# Patient Record
Sex: FEMALE | Race: OTHER | HISPANIC OR LATINO | Employment: FULL TIME | URBAN - METROPOLITAN AREA
[De-identification: names, ages, dates, MRNs, and addresses within clinical notes are randomized per-mention and may not be internally consistent; named-entity substitution may affect disease eponyms.]

---

## 2017-03-03 ENCOUNTER — ALLSCRIPTS OFFICE VISIT (OUTPATIENT)
Dept: OTHER | Facility: OTHER | Age: 62
End: 2017-03-03

## 2017-06-13 ENCOUNTER — ALLSCRIPTS OFFICE VISIT (OUTPATIENT)
Dept: OTHER | Facility: OTHER | Age: 62
End: 2017-06-13

## 2017-06-30 ENCOUNTER — GENERIC CONVERSION - ENCOUNTER (OUTPATIENT)
Dept: OTHER | Facility: OTHER | Age: 62
End: 2017-06-30

## 2017-07-03 ENCOUNTER — ALLSCRIPTS OFFICE VISIT (OUTPATIENT)
Dept: OTHER | Facility: OTHER | Age: 62
End: 2017-07-03

## 2017-11-17 ENCOUNTER — ALLSCRIPTS OFFICE VISIT (OUTPATIENT)
Dept: OTHER | Facility: OTHER | Age: 62
End: 2017-11-17

## 2017-11-19 NOTE — PROGRESS NOTES
Assessment    1  Controlled type 2 diabetes mellitus without complication, without long-term current use of insulin (250 00) (E11 9)   2  Hyperlipidemia (272 4) (E78 5)   3  Hypothyroidism (244 9) (E03 9)   4  Generalized anxiety disorder (300 02) (F41 1)    Plan  Controlled type 2 diabetes mellitus without complication, without long-term current useof insulin    · (1) CBC/PLT/DIFF; Status:Active; Requested for:17May2018;    · (1) COMPREHENSIVE METABOLIC PANEL; Status:Active; Requested for:17May2018;    · (1) HEMOGLOBIN A1C; Status:Active; Requested for:17May2018;    · (1) URINALYSIS (will reflex a microscopy if leukocytes, occult blood, protein or nitrites arenot within normal limits); Status:Active; Requested for:17May2018;   Generalized anxiety disorder    · DULoxetine HCl - 60 MG Oral Capsule Delayed Release Particles (Cymbalta);TAKE 1 in am, 2 in pm  Hyperlipidemia    · (1) LIPID PANEL, FASTING; Status:Active; Requested for:17May2018;   Hypothyroidism    · Levothyroxine Sodium 75 MCG Oral Tablet; Take 1 tablet daily   · (1) T4, FREE; Status:Active; Requested for:17May2018;    · (1) TSH; Status:Active; Requested for:17May2018; I will increase her do locks a teen  I will see her in 6 months  Call the office if symptoms do not improve or if they get worse  Continue diet and exercise  Chief Complaint  Here for checkup DM,lipids,thyroid      History of Present Illness  Patient is a 11year-old female presenting to the office for review offered diet-controlled diabetes, hyperlipidemia, hypothyroidism, anxiety, and general health  She had recent lab work which would like to review  Overall her physical health is good, she does feel more stressed and she is having more trouble with her âfibromyalgiaâ  Review of Systems   Constitutional: No fever, no chills, feels well, no tiredness, no recent weight gain or weight loss    Eyes: No complaints of eye pain, no red eyes, no eyesight problems, no discharge, no dry eyes, no itching of eyes  ENT: no complaints of earache, no loss of hearing, no nose bleeds, no nasal discharge, no sore throat, no hoarseness  Cardiovascular: No complaints of slow heart rate, no fast heart rate, no chest pain, no palpitations, no leg claudication, no lower extremity edema  Respiratory: No complaints of shortness of breath, no wheezing, no cough, no SOB on exertion, no orthopnea, no PND  Gastrointestinal: No complaints of abdominal pain, no constipation, no nausea or vomiting, no diarrhea, no bloody stools  Genitourinary: No complaints of dysuria, no incontinence, no pelvic pain, no dysmenorrhea, no vaginal discharge or bleeding  Musculoskeletal: arthralgias-- and-- myalgias  Integumentary: No complaints of skin rash or lesions, no itching, no skin wounds, no breast pain or lump  Neurological: No complaints of headache, no confusion, no convulsions, no numbness, no dizziness or fainting, no tingling, no limb weakness, no difficulty walking  Psychiatric: anxiety  Endocrine: No complaints of proptosis, no hot flashes, no muscle weakness, no deepening of the voice, no feelings of weakness  Hematologic/Lymphatic: No complaints of swollen glands, no swollen glands in the neck, does not bleed easily, does not bruise easily  Active Problems  1  Dietary calcium deficiency (269 3) (E58)   2  Fibromyalgia (729 1) (M79 7)   3  Generalized anxiety disorder (300 02) (F41 1)   4  Hyperlipidemia (272 4) (E78 5)   5  Hypothyroidism (244 9) (E03 9)   6  History of Inadequate exercise (V69 0) (Z72 3)   7  Obstructive sleep apnea (327 23) (G47 33)    Past Medical History    1  History of Cervical somatic dysfunction (739 1) (M99 01)   2  History of Colon cancer screening (V76 51) (Z12 11)   3  History of Encounter for gynecological examination without abnormal finding (V72 31) (Z01 419)   4  History of Encounter for screening mammogram for malignant neoplasm of breast (V76 12) (Z12 31)   5  History of Endometrial polyp (621 0) (N84 0)   6  History of Fibroids, submucosal (218 0) (D25 0)   7  Denied: History of abnormal cervical Pap smear   8  History of diabetes mellitus (V12 29) (Z86 39)   9  Denied: History of herpes simplex infection   10  History of hypercholesterolemia (V12 29) (Z86 39)   11  History of pregnancy (V13 29)   12  History of screening mammography (V15 89) (Z92 89)   13  History of sleep disturbance (V13 89) (Z87 898)   14  Denied: History of varicella   15  History of Myalgia and myositis (729 1)   16  History of Need for influenza vaccination (V04 81) (Z23)   17  History of Post-menopausal bleeding (627 1) (N95 0)   18  History of Screening for diabetic retinopathy (V80 2) (Z13 5)    The active problems and past medical history were reviewed and updated today  Surgical History  1  History of Breast Surgery Reduction Procedure Bilateral   2  History of  Section   3  History of Complete Colonoscopy   4  History of Dilation And Curettage   5  History of Hysteroscopy   6  History of Tubal Ligation    The surgical history was reviewed and updated today  Family History  Mother    1  Family history of Coronary Artery Disease (V17 49)   2  Family history of Diabetes Mellitus (V18 0)   3  Family history of hypercholesterolemia (V18 19) (Z83 42)    The family history was reviewed and updated today  Social History     · Denied: History of Alcohol Use (History)   · Caffeine Use   · Denied: History of Drug Use   · History of Inadequate exercise (V69 0) (Z72 3)   ·    · Never A Smoker   · Occupation   · Uses Safety Equipment - Seatbelts  The social history was reviewed and updated today  The social history was reviewed and is unchanged  Current Meds   1  Aspirin 81 MG TABS; take 1 tablet every other day; Therapy: (Neal Aguiar) to Recorded   2  [employer];  Check sugars once daily, fasting on one day, and 2 hours after breakfast lunch or dinner on other days  Record her sugars; Therapy: 14HQK0191 to (Last Rx:04Jun2013)  Requested for: 29BRH1926 Ordered   3  Andrew Microlet Lancets Miscellaneous; USE AS DIRECTED; Therapy: 09XPZ4047 to (Last Rx:04Jun2013)  Requested for: 14SXA0693 Ordered   4  Calcitrate TABS; Take 1 tablet daily; Therapy: (Recorded:05Mar2013) to Recorded   5  DULoxetine HCl - 60 MG Oral Capsule Delayed Release Particles; take 1 capsule daily; Therapy: 75TSC2772 to (Last Rx:47Thu9620)  Requested for: 67OFM8655 Ordered   6  Ibuprofen 800 MG Oral Tablet; TAKE 1 TABLET 3 TIMES DAILY WITH FOOD AS NEEDED; Therapy: 02TVY4512 to (Evaluate:02Apr2017)  Requested for: 70YCG1027; Last Rx:03Mar2017 Ordered   7  Levothyroxine Sodium 75 MCG Oral Tablet; TAKE 1 TABLET DAILY AS DIRECTED; Therapy: 41XPB8156 to (Coy Rodriguez)  Requested for: 73TWX6426; Last Rx:17Wvc1624 Ordered   8  Multi-Day Vitamins TABS; TAKE 1 TABLET DAILY; Therapy: (Recorded:05Mar2013) to Recorded    The medication list was reviewed and updated today  Allergies  1  No Known Drug Allergies    Vitals  Vital Signs    Recorded: 88HXA1444 02:22PM   Temperature 47 5 F   Systolic 757, LUE, Sitting   Diastolic 78, LUE, Sitting   BP CUFF SIZE Large   Height 5 ft 1 in   Weight 152 lb 8 oz   BMI Calculated 28 81   BSA Calculated 1 68       Physical Exam   Constitutional  General appearance: No acute distress, well appearing and well nourished  Eyes  Conjunctiva and lids: No swelling, erythema or discharge  Pupils and irises: Equal, round and reactive to light  Ears, Nose, Mouth, and Throat  External inspection of ears and nose: Normal    Otoscopic examination: Tympanic membranes translucent with normal light reflex  Canals patent without erythema  Nasal mucosa, septum, and turbinates: Normal without edema or erythema  Oropharynx: Normal with no erythema, edema, exudate or lesions     Pulmonary  Respiratory effort: No increased work of breathing or signs [address] Reference   Colonoscopy 6/11/12         Summary / No summary entered :    No summary entered  Documents attached :    [address] [place] [address] Rectal Surgery - Wale Aguilar; Enc: [id number] - Other - Wale Aguilar -    [facility]) (Result Document)    Health Management  History of diabetes mellitus   *VB - Eye Exam; every 2 years; Last 52TVY4293; Next Due: 95MYM2038; Active  History of Screening for diabetic retinopathy   *VB - Eye Exam; every 2 years; Last 91TJW2306; Next Due: 99WAV6533;  Active    Signatures   Electronically signed by : Mayra Jensen DO; Nov 17 2017  2:55PM EST                       (Author)

## 2017-12-13 DIAGNOSIS — E11.9 TYPE 2 DIABETES MELLITUS WITHOUT COMPLICATIONS (HCC): ICD-10-CM

## 2018-01-12 VITALS
WEIGHT: 152.5 LBS | SYSTOLIC BLOOD PRESSURE: 118 MMHG | DIASTOLIC BLOOD PRESSURE: 78 MMHG | BODY MASS INDEX: 28.79 KG/M2 | HEIGHT: 61 IN | TEMPERATURE: 96.4 F

## 2018-01-14 VITALS
TEMPERATURE: 96.6 F | HEIGHT: 61 IN | BODY MASS INDEX: 28.51 KG/M2 | SYSTOLIC BLOOD PRESSURE: 122 MMHG | WEIGHT: 151 LBS | DIASTOLIC BLOOD PRESSURE: 74 MMHG

## 2018-01-14 VITALS
HEIGHT: 61 IN | DIASTOLIC BLOOD PRESSURE: 78 MMHG | BODY MASS INDEX: 27.97 KG/M2 | SYSTOLIC BLOOD PRESSURE: 124 MMHG | WEIGHT: 148.13 LBS

## 2018-01-14 VITALS
HEIGHT: 61 IN | BODY MASS INDEX: 27.75 KG/M2 | WEIGHT: 147 LBS | DIASTOLIC BLOOD PRESSURE: 78 MMHG | SYSTOLIC BLOOD PRESSURE: 124 MMHG

## 2018-03-06 DIAGNOSIS — E03.9 ACQUIRED HYPOTHYROIDISM: Primary | ICD-10-CM

## 2018-03-06 RX ORDER — LEVOTHYROXINE SODIUM 0.07 MG/1
TABLET ORAL
Qty: 90 TABLET | Refills: 1 | Status: SHIPPED | OUTPATIENT
Start: 2018-03-06 | End: 2018-09-10 | Stop reason: SDUPTHER

## 2018-05-08 LAB — HBA1C MFR BLD HPLC: 6.2 %

## 2018-05-15 ENCOUNTER — OFFICE VISIT (OUTPATIENT)
Dept: FAMILY MEDICINE CLINIC | Facility: CLINIC | Age: 63
End: 2018-05-15
Payer: COMMERCIAL

## 2018-05-15 VITALS
DIASTOLIC BLOOD PRESSURE: 78 MMHG | WEIGHT: 154 LBS | HEIGHT: 61 IN | SYSTOLIC BLOOD PRESSURE: 120 MMHG | BODY MASS INDEX: 29.07 KG/M2

## 2018-05-15 DIAGNOSIS — F41.1 GENERALIZED ANXIETY DISORDER: Primary | ICD-10-CM

## 2018-05-15 DIAGNOSIS — E11.9 CONTROLLED TYPE 2 DIABETES MELLITUS WITHOUT COMPLICATION, WITHOUT LONG-TERM CURRENT USE OF INSULIN (HCC): ICD-10-CM

## 2018-05-15 DIAGNOSIS — Z01.419 ENCOUNTER FOR ANNUAL ROUTINE GYNECOLOGICAL EXAMINATION: ICD-10-CM

## 2018-05-15 DIAGNOSIS — M79.7 FIBROMYALGIA: ICD-10-CM

## 2018-05-15 DIAGNOSIS — E03.9 ACQUIRED HYPOTHYROIDISM: ICD-10-CM

## 2018-05-15 DIAGNOSIS — E78.5 DYSLIPIDEMIA: ICD-10-CM

## 2018-05-15 PROCEDURE — 99214 OFFICE O/P EST MOD 30 MIN: CPT | Performed by: FAMILY MEDICINE

## 2018-05-15 RX ORDER — VENLAFAXINE 75 MG/1
75 TABLET ORAL 2 TIMES DAILY
Qty: 60 TABLET | Refills: 5 | Status: SHIPPED | OUTPATIENT
Start: 2018-05-15 | End: 2018-08-14

## 2018-05-15 RX ORDER — DULOXETIN HYDROCHLORIDE 60 MG/1
60 CAPSULE, DELAYED RELEASE ORAL
COMMUNITY
End: 2018-05-15 | Stop reason: ALTCHOICE

## 2018-05-15 RX ORDER — MULTIVITAMIN
1 TABLET ORAL DAILY
COMMUNITY
End: 2018-08-14

## 2018-05-15 NOTE — ASSESSMENT & PLAN NOTE
Favorable total cholesterol/HDL ratio at 4 0  13  Continue dietary discretion, increased exercise    Recheck in 6 months

## 2018-05-15 NOTE — PROGRESS NOTES
Assessment/Plan:    Generalized anxiety disorder  Has been up and down with not taking her medication on regular basis  Will switch her to venlaxafine  and see if taking the medication on a regular basis helps to control her symptoms  I will see her in 2 months, she knows to call me if symptoms do not stabilize  Hypothyroidism  Lab work is stable  Continue current dose of levothyroxine  Recheck in 6 months  Dyslipidemia  Favorable total cholesterol/HDL ratio at 4 0  13  Continue dietary discretion, increased exercise  Recheck in 6 months    Controlled type 2 diabetes mellitus without complication, without long-term current use of insulin ([facility] [address] )  Diet-controlled  Sugars at home are good between 100/140  HB A1c is 6 2  Continue current diet and exercise  Fibromyalgia  Has not been doing well, due to not take medication consistently  Will switch her to generic Effexor and see if she gets some benefit from this  If not, I will refer her to Rheumatology as she does not want follow up with the former group  Diagnoses and all orders for this visit:    Generalized anxiety disorder  -     venlafaxine (EFFEXOR) 75 mg tablet; Take 1 tablet (75 mg total) by mouth 2 (two) times a day    Fibromyalgia  -     venlafaxine (EFFEXOR) 75 mg tablet; Take 1 tablet (75 mg total) by mouth 2 (two) times a day    Dyslipidemia    Acquired hypothyroidism    Controlled type 2 diabetes mellitus without complication, without long-term current use of insulin ([facility])    Other orders  -     aspirin 81 MG tablet; Take 81 mg by mouth  -     Discontinue: DULoxetine (CYMBALTA) 60 mg delayed release capsule; Take 60 mg by mouth  -     Multiple Vitamin (MULTI-DAY VITAMINS) TABS; Take 1 tablet by mouth daily          Subjective:   Chief Complaint   Patient presents with    Hypothyroidism    Depression     review blood work           Patient ID: Van Sport is a 65 y o  female      Patient is a 81-year-old female presenting to the office for follow-up on her hypothyroidism, diet-controlled diabetes, diet-controlled hyperlipidemia, anxiety, fibromyalgia, and general health  Recently her formulary changed with her insurance  Her generic Cymbalta now costs 50 all his for 15 pills, and she cannot afford this  She has been taking her medications per radically, and therefore suffering  Her anxiety is not well controlled, she can tell if she does not take medication on that day  Also her pain has been poorly controlled  She will be due for a Pap smear and mammogram in July, but her gynecologist retired and she would like recommendations  The following portions of the patient's history were reviewed and updated as appropriate: allergies, current medications, past family history, past medical history, past social history, past surgical history and problem list     Review of Systems   Constitutional: Positive for activity change  Negative for chills, diaphoresis, fatigue, fever and unexpected weight change  HENT: Negative for congestion, ear pain, hearing loss, nosebleeds, postnasal drip, rhinorrhea, sinus pressure, sore throat and tinnitus  Eyes: Negative for photophobia, pain, discharge, redness and visual disturbance  Respiratory: Negative for cough, chest tightness, shortness of breath and wheezing  Cardiovascular: Negative for chest pain, palpitations and leg swelling  Denies NEWSOME   Gastrointestinal: Negative for abdominal pain, blood in stool, constipation, diarrhea, nausea and vomiting  Endocrine: Negative  Genitourinary: Negative for difficulty urinating, dysuria, frequency, hematuria and urgency  Denies dysmenorrhea, menstrual difficulties   Musculoskeletal: Positive for back pain and myalgias  Negative for arthralgias and joint swelling  Skin: Negative for rash and wound          Denies skin lesions, change in birthmarks   Allergic/Immunologic: Negative for environmental allergies, food allergies and immunocompromised state  Neurological: Negative for dizziness, tremors, seizures, syncope, weakness, numbness and headaches  Hematological: Negative  Psychiatric/Behavioral: Positive for dysphoric mood  Negative for behavioral problems, confusion, decreased concentration and sleep disturbance  The patient is nervous/anxious  Objective:      /78   Ht 5' 1" (1 549 m)   Wt 69 9 kg (154 lb)   BMI 29 10 kg/m²          Physical Exam   Constitutional: She is oriented to person, place, and time  She appears well-developed and well-nourished  No distress  HENT:   Head: Normocephalic and atraumatic  Right Ear: External ear normal    Left Ear: External ear normal    Nose: Nose normal    Mouth/Throat: Oropharynx is clear and moist    Eyes: Conjunctivae and EOM are normal  Pupils are equal, round, and reactive to light  Neck: Normal range of motion  Neck supple  No JVD present  No tracheal deviation present  No thyromegaly present  Cardiovascular: Normal rate, regular rhythm and normal heart sounds  No murmur heard  Pulmonary/Chest: Effort normal and breath sounds normal  She has no wheezes  She has no rales  Abdominal: Soft  Bowel sounds are normal  She exhibits no mass  There is no tenderness  There is no rebound and no guarding  Musculoskeletal: Normal range of motion  She exhibits no edema  Thoracic back: She exhibits tenderness  Lumbar back: She exhibits tenderness  Lymphadenopathy:     She has no cervical adenopathy  Neurological: She is alert and oriented to person, place, and time  She has normal reflexes  No cranial nerve deficit  Skin: Skin is warm and dry  No lesion and no rash noted  Psychiatric: She has a normal mood and affect   Judgment normal

## 2018-05-15 NOTE — ASSESSMENT & PLAN NOTE
Diet-controlled  Sugars at home are good between 100/140  HB A1c is 6 2  Continue current diet and exercise

## 2018-05-15 NOTE — ASSESSMENT & PLAN NOTE
Has been up and down with not taking her medication on regular basis  Will switch her to venlaxafine  and see if taking the medication on a regular basis helps to control her symptoms  I will see her in 2 months, she knows to call me if symptoms do not stabilize

## 2018-05-15 NOTE — ASSESSMENT & PLAN NOTE
Has not been doing well, due to not take medication consistently  Will switch her to generic Effexor and see if she gets some benefit from this  If not, I will refer her to Rheumatology as she does not want follow up with the former group

## 2018-05-17 DIAGNOSIS — E03.9 HYPOTHYROIDISM: ICD-10-CM

## 2018-05-17 DIAGNOSIS — E78.5 HYPERLIPIDEMIA: ICD-10-CM

## 2018-05-17 DIAGNOSIS — E11.9 TYPE 2 DIABETES MELLITUS WITHOUT COMPLICATIONS (HCC): ICD-10-CM

## 2018-07-03 ENCOUNTER — TELEPHONE (OUTPATIENT)
Dept: FAMILY MEDICINE CLINIC | Facility: CLINIC | Age: 63
End: 2018-07-03

## 2018-07-03 NOTE — TELEPHONE ENCOUNTER
Have her take 1/2 a tablet today and tomorrow and discuss it with Dr Michael Sanderson on Thursday I also see patient has an OV with him scheduled

## 2018-07-17 ENCOUNTER — OFFICE VISIT (OUTPATIENT)
Dept: FAMILY MEDICINE CLINIC | Facility: CLINIC | Age: 63
End: 2018-07-17
Payer: COMMERCIAL

## 2018-07-17 VITALS
HEIGHT: 61 IN | SYSTOLIC BLOOD PRESSURE: 110 MMHG | DIASTOLIC BLOOD PRESSURE: 80 MMHG | BODY MASS INDEX: 29.04 KG/M2 | WEIGHT: 153.8 LBS

## 2018-07-17 DIAGNOSIS — F41.1 GENERALIZED ANXIETY DISORDER: Primary | ICD-10-CM

## 2018-07-17 DIAGNOSIS — M13.0 POLYARTHROPATHY: ICD-10-CM

## 2018-07-17 DIAGNOSIS — M79.7 FIBROMYALGIA: ICD-10-CM

## 2018-07-17 PROBLEM — R79.89 ABNORMAL LIVER FUNCTION TESTS: Status: ACTIVE | Noted: 2018-07-17

## 2018-07-17 PROCEDURE — 99214 OFFICE O/P EST MOD 30 MIN: CPT | Performed by: FAMILY MEDICINE

## 2018-07-17 NOTE — ASSESSMENT & PLAN NOTE
Moods are better on Effexor  , they were even better on generic Cymbalta, but is too expensive for the patient  Will continue current dose for now    Recheck in 3 months

## 2018-07-17 NOTE — ASSESSMENT & PLAN NOTE
No improvement with generic Effexor  Lower back is bothering her a bit right now  Would like to get a 2nd opinion  Will refer to Rheumatology  Will check some additional markers as well  Staff will contact her once we have the results

## 2018-07-17 NOTE — PROGRESS NOTES
Assessment/Plan:    Generalized anxiety disorder  Moods are better on Effexor  , they were even better on generic Cymbalta, but is too expensive for the patient  Will continue current dose for now  Recheck in 3 months    Fibromyalgia  No improvement with generic Effexor  Lower back is bothering her a bit right now  Would like to get a 2nd opinion  Will refer to Rheumatology  Will check some additional markers as well  Staff will contact her once we have the results  Diagnoses and all orders for this visit:    Generalized anxiety disorder    Fibromyalgia  -     Ambulatory referral to Rheumatology; Future  -     TRACIE Screen w/ Reflex to Titer/Pattern; Future  -     Comprehensive metabolic panel; Future  -     C-reactive protein; Future  -     TSH, 3rd generation; Future  -     RF Screen w/ Reflex to Titer; Future  -     Sedimentation rate, automated; Future  -     CBC and differential; Future  -     HLA-B27 ANTIGEN (HISTORICAL); Future  -     CK (with reflex to MB); Future  -     Aldolase; Future  -     Cyclic citrul peptide antibody, IgG; Future  -     Sjogren's Antibodies; Future    Polyarthropathy          Subjective:   Chief Complaint   Patient presents with    Follow-up     anxiety  no refills needed           Patient ID: Latoya Sousa is a 65 y o  female  Patient is a 52-year-old female presenting to the office for follow-up on her anxiety and fibromyalgia  She was started on Effexor approximately2  months ago  Had been on generic Cymbalta in the past, unfortunately there is a very high co-pay for that medication with her insurance plan and is not covered so she was only taking it intermittently  That medication did seem to help her a bit  She reports that her moods have improved, but her muscle aches and pains are if anything worse  Now having more difficulty with her lumbar area          The following portions of the patient's history were reviewed and updated as appropriate: allergies, current medications, past family history, past medical history, past social history, past surgical history and problem list     Review of Systems   Constitutional: Positive for activity change and fatigue  Negative for appetite change, fever and unexpected weight change  HENT: Negative  Eyes: Negative  Respiratory: Negative  Cardiovascular: Negative  Gastrointestinal: Negative  Endocrine: Negative  Genitourinary: Negative  Musculoskeletal: Positive for arthralgias, back pain, gait problem, joint swelling, myalgias, neck pain and neck stiffness  Neurological: Negative for dizziness, tremors, weakness, light-headedness, numbness and headaches  Hematological: Negative for adenopathy  Does not bruise/bleed easily  Psychiatric/Behavioral: Positive for decreased concentration and dysphoric mood  The patient is nervous/anxious  Objective:      /80   Ht 5' 1" (1 549 m)   Wt 69 8 kg (153 lb 12 8 oz)   BMI 29 06 kg/m²          Physical Exam   Constitutional: She is oriented to person, place, and time  She appears well-developed and well-nourished  No distress  HENT:   Head: Normocephalic and atraumatic  Right Ear: External ear normal    Left Ear: External ear normal    Nose: Nose normal    Mouth/Throat: Oropharynx is clear and moist    Eyes: Conjunctivae and EOM are normal  Pupils are equal, round, and reactive to light  Neck: Normal range of motion  Neck supple  No JVD present  No tracheal deviation present  No thyromegaly present  Cardiovascular: Normal rate, regular rhythm and normal heart sounds  No murmur heard  Pulmonary/Chest: Effort normal and breath sounds normal  She has no wheezes  She has no rales  Abdominal: Soft  Bowel sounds are normal  She exhibits no mass  There is no tenderness  There is no rebound and no guarding  Musculoskeletal: Normal range of motion  She exhibits no edema or tenderness     Tenderness of the thoracolumbar spine, worse in the right lumbar area  Multiple triggering points throughout however  Lymphadenopathy:     She has no cervical adenopathy  Neurological: She is alert and oriented to person, place, and time  She has normal reflexes  No cranial nerve deficit  Skin: Skin is warm and dry  No lesion and no rash noted  Psychiatric: She has a normal mood and affect  Judgment normal    Nursing note and vitals reviewed

## 2018-07-20 ENCOUNTER — HOSPITAL ENCOUNTER (EMERGENCY)
Facility: HOSPITAL | Age: 63
Discharge: HOME | End: 2018-07-20
Admitting: EMERGENCY MEDICINE
Payer: COMMERCIAL

## 2018-07-20 ENCOUNTER — TELEPHONE (OUTPATIENT)
Dept: PHYSICAL THERAPY | Facility: OTHER | Age: 63
End: 2018-07-20

## 2018-07-20 ENCOUNTER — DOCUMENTATION (OUTPATIENT)
Dept: PHYSICAL THERAPY | Facility: OTHER | Age: 63
End: 2018-07-20

## 2018-07-20 ENCOUNTER — APPOINTMENT (EMERGENCY)
Dept: RADIOLOGY | Facility: HOSPITAL | Age: 63
End: 2018-07-20
Payer: COMMERCIAL

## 2018-07-20 VITALS
SYSTOLIC BLOOD PRESSURE: 160 MMHG | DIASTOLIC BLOOD PRESSURE: 91 MMHG | OXYGEN SATURATION: 100 % | HEART RATE: 89 BPM | RESPIRATION RATE: 18 BRPM | TEMPERATURE: 98 F

## 2018-07-20 DIAGNOSIS — M51.36 DEGENERATIVE DISC DISEASE, LUMBAR: ICD-10-CM

## 2018-07-20 DIAGNOSIS — M54.50 LOW BACK PAIN: Primary | ICD-10-CM

## 2018-07-20 LAB
BILIRUB UR QL STRIP: NEGATIVE
CLARITY UR: NORMAL
COLOR UR: YELLOW
GLUCOSE UR STRIP-MCNC: NEGATIVE MG/DL
HGB UR QL STRIP.AUTO: NEGATIVE
KETONES UR STRIP-MCNC: NEGATIVE MG/DL
LEUKOCYTE ESTERASE UR QL STRIP: NEGATIVE
NITRITE UR QL STRIP: NEGATIVE
PH UR STRIP.AUTO: 6 [PH] (ref 4.5–8)
PROT UR STRIP-MCNC: NEGATIVE MG/DL
SP GR UR STRIP.AUTO: 1.02 (ref 1–1.03)
UROBILINOGEN UR QL STRIP.AUTO: 0.2 E.U./DL

## 2018-07-20 PROCEDURE — 99283 EMERGENCY DEPT VISIT LOW MDM: CPT

## 2018-07-20 PROCEDURE — 72100 X-RAY EXAM L-S SPINE 2/3 VWS: CPT

## 2018-07-20 PROCEDURE — 81003 URINALYSIS AUTO W/O SCOPE: CPT

## 2018-07-20 RX ORDER — CYCLOBENZAPRINE HCL 5 MG
5 TABLET ORAL 3 TIMES DAILY PRN
Qty: 15 TABLET | Refills: 0 | Status: SHIPPED | OUTPATIENT
Start: 2018-07-20 | End: 2018-08-14

## 2018-07-20 RX ORDER — NAPROXEN 500 MG/1
500 TABLET ORAL 2 TIMES DAILY WITH MEALS
Qty: 30 TABLET | Refills: 0 | Status: SHIPPED | OUTPATIENT
Start: 2018-07-20 | End: 2018-08-14

## 2018-07-20 NOTE — ED PROVIDER NOTES
History  Chief Complaint   Patient presents with    Back Pain     Patient reports lower back pain which began a week ago  Denies injury, radiation of pain, loss of bowel or bladder function  Patient is reporting increased urinary frequency and burning with urination  Patient c/o low back pain x 1 week  Worse with movement- twisting and bending  Also c/o urinary frequency and lower abdominal pressure  Thought she had a yeast infection, took Monistat  Back Pain   Location:  Lumbar spine  Quality:  Aching  Radiates to:  Does not radiate  Pain severity:  Moderate  Pain is:  Same all the time  Onset quality:  Gradual  Duration:  1 week  Timing:  Constant  Progression:  Unchanged  Chronicity:  New  Context: not recent injury    Relieved by:  Nothing  Worsened by:  Nothing  Ineffective treatments:  None tried  Associated symptoms: pelvic pain (pressure)    Associated symptoms: no bladder incontinence, no bowel incontinence, no chest pain, no dysuria, no headaches, no perianal numbness, no tingling and no weakness        Prior to Admission Medications   Prescriptions Last Dose Informant Patient Reported? Taking? Multiple Vitamin (MULTI-DAY VITAMINS) TABS 7/20/2018 at Unknown time  Yes Yes   Sig: Take 1 tablet by mouth daily   aspirin 81 MG tablet 7/20/2018 at Unknown time  Yes Yes   Sig: Take 81 mg by mouth   levothyroxine 75 mcg tablet 7/20/2018 at Unknown time  No Yes   Sig: TAKE 1 TABLET DAILY AS DIRECTED     venlafaxine (EFFEXOR) 75 mg tablet Past Week at Unknown time  No Yes   Sig: Take 1 tablet (75 mg total) by mouth 2 (two) times a day   Patient taking differently: Take 75 mg by mouth daily        Facility-Administered Medications: None       Past Medical History:   Diagnosis Date    Abnormal cervical Papanicolaou smear     6/15 wnl, -HPV 6/18    Cervical somatic dysfunction     last assessed 3/3/2017    Diabetes mellitus ([facility] [address] )     last assessed 6/11/2017    Endometrial polyp     last assessed 11/10/2014    Fibroids, submucosal 2015    [id number] anteriorly by [facility]    Herpes simplex     Hypercholesterolemia     resolved 2015    Postmenopausal bleeding     last assessed 11/10/2014, endometrial polyp, D&C 10/14    Sleep disturbance     last assessed 3/5/2012    Varicella        Past Surgical History:   Procedure Laterality Date    BREAST SURGERY Bilateral     2014  reduction procedure     SECTION  1976    fetal intolerance    COLONOSCOPY      resolved 2012, Dr Braulio Boston - normal repeat in     [facility]  2014    PMB, polyp path;  endometrial polyp with focal tubular hyperplasia, benign, inactive endometrial curettings    HYSTEROSCOPY  2014    path; benign    TUBAL LIGATION Bilateral        Family History   Problem Relation Age of Onset    Coronary artery disease Mother     Diabetes Mother         DM    Hyperlipidemia Mother         hypercholesterolemia     I have reviewed and agree with the history as documented  Social History   Substance Use Topics    Smoking status: Never Smoker    Smokeless tobacco: Never Used    Alcohol use No      Comment: history        Review of Systems   Constitutional: Negative for activity change  Eyes: Negative for visual disturbance  Respiratory: Negative for shortness of breath  Cardiovascular: Negative for chest pain  Gastrointestinal: Negative for bowel incontinence  Genitourinary: Positive for frequency and pelvic pain (pressure)  Negative for bladder incontinence and dysuria  Musculoskeletal: Positive for back pain  Skin: Negative for color change  Neurological: Negative for dizziness, tingling, weakness and headaches  Psychiatric/Behavioral: Negative for behavioral problems  Physical Exam  Physical Exam   Constitutional: She is oriented to person, place, and time  She appears well-developed and well-nourished  No distress  HENT:   Head: Normocephalic and atraumatic  Eyes: Conjunctivae and EOM are normal    Pulmonary/Chest: Effort normal  No respiratory distress  Musculoskeletal: Normal range of motion  Lumbar back: She exhibits normal range of motion, no tenderness, no bony tenderness, no swelling, no pain and no spasm  Negative straight leg raise   Neurological: She is alert and oriented to person, place, and time  She has normal strength  No sensory deficit  Coordination and gait normal    Reflex Scores:       Patellar reflexes are 2+ on the right side and 3+ on the left side  Skin: Skin is warm and dry  No rash noted  Psychiatric: She has a normal mood and affect   Her behavior is normal        Vital Signs  ED Triage Vitals [07/20/18 1409]   Temperature Pulse Respirations Blood Pressure SpO2   98 °F (36 7 °C) 89 18 160/91 100 %      Temp Source Heart Rate Source Patient Position - Orthostatic VS BP Location FiO2 (%)   Temporal Monitor Sitting Right arm --      Pain Score       5           Vitals:    07/20/18 1409   BP: 160/91   Pulse: 89   Patient Position - Orthostatic VS: Sitting       Visual Acuity      ED Medications  Medications - No data to display    Diagnostic Studies  Results Reviewed     Procedure Component Value Units Date/Time    POCT urinalysis dipstick [[id number]]  (Abnormal) Resulted:  07/20/18 1454    Lab Status:  Final result Specimen:  Urine Updated:  07/20/18 1454    ED Urine Macroscopic [[id number]] Collected:  07/20/18 1451    Lab Status:  Final result Specimen:  Urine Updated:  07/20/18 1445     Color, UA Yellow     Clarity, UA Cloudy     pH, UA 6 0     Leukocytes, UA Negative     Nitrite, UA Negative     Protein, UA Negative mg/dl      Glucose, UA Negative mg/dl      Ketones, UA Negative mg/dl      Urobilinogen, UA 0 2 E U /dl      Bilirubin, UA Negative     Blood, UA Negative     Specific Gravity, UA 1 020    Narrative:       CLINITEK RESULT                 XR lumbar spine 2 or 3 views   ED Interpretation by Jayant Rebolledo PA-C (07/20 9009)   Mild disc degeneration throughout most pronounced at L5-S1      Final Result by Valeria Beal MD (07/20 1919)      Degenerative change  Possible spondylolysis at L5 without spondylolisthesis  Workstation performed: [id number]                    Procedures  Procedures       Phone Contacts  ED Phone Contact    ED Course                               MDM  CritCare Time    Disposition  Final diagnoses:   Low back pain   Degenerative disc disease, lumbar     Time reflects when diagnosis was documented in both MDM as applicable and the Disposition within this note     Time User Action Codes Description Comment    7/20/2018  3:15 PM Scarlet Devoid Add [M54 5] Low back pain     7/20/2018  3:15 PM Scarlet Devoid Add [M51 36] Degenerative disc disease, lumbar       ED Disposition     ED Disposition Condition Comment    Discharge  Alexandro Truong discharge to home/self care      Condition at discharge: Stable        Follow-up Information     Follow up With Specialties Details Why Contact Info Additional [address] [facility],  Family Medicine   [address] Emergency Department Emergency Medicine  As needed, If symptoms worsen [address] [facility] ED, [address] [facility] , [facility], [address] [facility], [address] [facility] Go in 1 week As needed [address], [address] [facility] [address] [facility], [address], [address] [facility], [facility], [address] [facility], [address]          Patient's Medications   Discharge Prescriptions    CYCLOBENZAPRINE (FLEXERIL) 5 MG TABLET    Take 1 tablet (5 mg total) by mouth 3 (three) times a day as needed for muscle spasms for up to 5 days       Start Date: 7/20/2018 End Date: 7/25/2018       Order Dose: 5 mg       Quantity: 15 tablet    Refills: 0    NAPROXEN (NAPROSYN) 500 MG TABLET Take 1 tablet (500 mg total) by mouth 2 (two) times a day with meals       Start Date: 7/20/2018 End Date: --       Order Dose: 500 mg       Quantity: 30 tablet    Refills: 0       Outpatient Discharge Orders  Ambulatory Referral to Comprehensive Spine Program   Standing Status: Future  Standing Exp   Date: 01/20/19         ED Provider  Electronically Signed by           Thee Johansen PA-C  07/20/18 1527

## 2018-07-20 NOTE — PROGRESS NOTES
In ED summary and notes, patient was instructed to go to [address] [facility] in [facility]  Referral transferred to appropriate source, when unable to contact patient, to avoid delay in appropriate coordination of care

## 2018-07-20 NOTE — TELEPHONE ENCOUNTER
Attempted to contact patient, patient unavailable  Left voicemail message with callback phone number

## 2018-07-20 NOTE — DISCHARGE INSTRUCTIONS
Acute Low Back Pain, Ambulatory Care   GENERAL INFORMATION:   Acute low back pain  is discomfort in your lower back area that lasts for less than 12 weeks  The word acute is used to describe pain that starts suddenly, worsens quickly, and lasts for a short time  Common symptoms include the following:   · Back stiffness or spasms    · Pain down the back or side of one leg    · Holding yourself in an unusual position or posture to decrease your back pain    · Not being able to find a sitting position that is comfortable    · Slow increase in your pain for 24 to 48 hours after you stress your back    · Tenderness on your lower back or severe pain when you move your back  Seek immediate care for the following symptoms:   · Severe pain    · Sudden stiffness and heaviness in both buttocks down to both legs    · Numbness or weakness in one leg, or pain in both legs    · Numbness in your genital area or across your lower back    · Unable to control your urine or bowel movements  Treatment for acute low back pain  may include any of the following:  · Medicines:      ¨ NSAIDs  help decrease swelling and pain or fever  This medicine is available with or without a doctor's order  NSAIDs can cause stomach bleeding or kidney problems in certain people  If you take blood thinner medicine, always ask your healthcare provider if NSAIDs are safe for you  Always read the medicine label and follow directions  ¨ Muscle relaxers  help decrease muscle spasms pain  ¨ Prescription pain medicine  may be given  Ask how to take this medicine safely  · Surgery  may be needed if your pain is severe and other treatments do not work  Surgery may be needed for conditions of the lumbar spine, such as herniated disc or spinal stenosis  Manage your symptoms:   · Sleep on a firm mattress  If you do not have a firm mattress, have someone move your mattress to the floor for a few days   A piece of plywood under your mattress can also help make it firmer  · Apply ice  on your lower back for 15 to 20 minutes every hour or as directed  Use an ice pack, or put crushed ice in a plastic bag  Cover it with a towel  Ice helps prevent tissue damage and decreases swelling and pain  You can alternate ice and heat  · Apply heat  on your lower back for 20 to 30 minutes every 2 hours for as many days as directed  Heat helps decrease pain and muscle spasms  · Go to physical therapy  A physical therapist teaches you exercises to help improve movement and strength, and to decrease pain  Prevent acute low back pain:   · Use proper body mechanics  ¨ Bend at the hips and knees when you  objects  Do not bend from the waist  Use your leg muscles as you lift the load  Do not use your back  Keep the object close to your chest as you lift it  Try not to twist or lift anything above your waist     ¨ Change your position often when you stand for long periods of time  Rest one foot on a small box or footrest, and then switch to the other foot often  ¨ Try not to sit for long periods of time  When you do, sit in a straight-backed chair with your feet flat on the floor  Never reach, pull, or push while you are sitting  · Exercise regularly  Warm up before you exercise  Do exercises that strengthen your back muscles  Ask about the best exercise plan for you  · Maintain a healthy weight  Ask your healthcare provider how much you should weigh  Ask him to help you create a weight loss plan if you are overweight  Follow up with your healthcare provider as directed:  Return for a follow-up visit if you still have pain after 1 to 3 weeks of treatment  You may need to visit an orthopedist if your back pain lasts more than 6 to 12 weeks  Write down your questions so you remember to ask them during your visits  CARE AGREEMENT:   You have the right to help plan your care  Learn about your health condition and how it may be treated   Discuss treatment options with your caregivers to decide what care you want to receive  You always have the right to refuse treatment  The above information is an  only  It is not intended as medical advice for individual conditions or treatments  Talk to your doctor, nurse or pharmacist before following any medical regimen to see if it is safe and effective for you  © 2014 [address] is for End User's use only and may not be sold, redistributed or otherwise used for commercial purposes  All illustrations and images included in CareNotes® are the copyrighted property of A D A M , Inc  or [employer]

## 2018-07-24 ENCOUNTER — NURSE TRIAGE (OUTPATIENT)
Dept: PHYSICAL THERAPY | Facility: OTHER | Age: 63
End: 2018-07-24

## 2018-07-24 DIAGNOSIS — M54.50 ACUTE RIGHT-SIDED LOW BACK PAIN WITHOUT SCIATICA: Primary | ICD-10-CM

## 2018-07-24 DIAGNOSIS — M54.2 NECK PAIN: ICD-10-CM

## 2018-07-24 NOTE — TELEPHONE ENCOUNTER
Reports neck pain as well but only bothers her in the right shoulder    Background - Initial Assessment  Clinical complaint: low back pain, right sided  Date of onset: 7/20/18  Mechanism of injury: unk    Previous Treatment - Previous Treatment  Previous evaluation: ED visit  Current provider: *No Answer*  Diagnostics: xray  Previous treatment: *No Answer*    Protocols used: [facility] PROTOCOL

## 2018-08-03 ENCOUNTER — EVALUATION (OUTPATIENT)
Dept: PHYSICAL THERAPY | Facility: MEDICAL CENTER | Age: 63
End: 2018-08-03
Payer: COMMERCIAL

## 2018-08-03 VITALS — TEMPERATURE: 98 F | HEART RATE: 74 BPM | SYSTOLIC BLOOD PRESSURE: 115 MMHG | DIASTOLIC BLOOD PRESSURE: 80 MMHG

## 2018-08-03 DIAGNOSIS — M54.50 ACUTE RIGHT-SIDED LOW BACK PAIN WITHOUT SCIATICA: ICD-10-CM

## 2018-08-03 DIAGNOSIS — M54.2 NECK PAIN: ICD-10-CM

## 2018-08-03 PROCEDURE — 97161 PT EVAL LOW COMPLEX 20 MIN: CPT | Performed by: PHYSICAL THERAPIST

## 2018-08-03 PROCEDURE — G8990 OTHER PT/OT CURRENT STATUS: HCPCS | Performed by: PHYSICAL THERAPIST

## 2018-08-03 PROCEDURE — G8992 OTHER PT/OT  D/C STATUS: HCPCS | Performed by: PHYSICAL THERAPIST

## 2018-08-03 PROCEDURE — G8991 OTHER PT/OT GOAL STATUS: HCPCS | Performed by: PHYSICAL THERAPIST

## 2018-08-03 NOTE — PROGRESS NOTES
PT Evaluation     Today's date: 8/3/2018  Patient name: Marvin Cuenca  : 1955  MRN: [id number]  Referring provider: Shauna Aquino MD  Dx:   Encounter Diagnosis     ICD-10-CM    1  Acute right-sided low back pain without sciatica M54 5 Ambulatory referral to PT spine   2  Neck pain M54 2 Ambulatory referral to PT spine                  Assessment  Impairments: abnormal coordination, abnormal muscle firing, abnormal muscle tone, abnormal or restricted ROM, abnormal movement, activity intolerance, impaired physical strength, lacks appropriate home exercise program and pain with function    Assessment details: Marvin Cuenca is a pleasant 65 y o  female who presents with lower back pain secondary to moderate arthritic changes and concurrent hypomobility of lumbar spine  patient has complicating factor of fibromyalgia  Patient notes that she is 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  Patient states that her back pain is worse with sitting or standing for long periods of time and makes transitions difficult  Today patient is seen through referral from [address] spine and has been placed in the low risk catagory  Patient was treated with stretches and core stability with education about posturing, foam rolling, and HEP  This will be her only visit to PT and she was instructed to return if needed in the future  No further referral appears necessary at this time based upon examination results  Primary movement impairment diagnosis of lumbar hypomobility resulting in pathoanatomical symptoms of Acute right-sided low back pain without sciatica  Neck pain and limiting her ability to go to work, sit, stand and walk    Impairments include:  1) lumbar hypomobility - addressing with mobs and mobility exercises   2) poor lifting mechanics - addressing with functional retraining  3) poor lumbopelvic movement coordination - addressing with neuromotor retraining  Etiologic factors include none recalled by the patient  Understanding of Dx/Px/POC: good   Prognosis: good  Prognosis details: Positive prognostic indicators include positive attitude toward recovery  Negative prognostic indicators include fibromyalgia    Goals  Patient will be independent with home exercise program   - GOAL MET    Plan  Patient would benefit from: skilled PT  Referral necessary: No  Planned modality interventions: thermotherapy: hydrocollator packs and TENS  Planned therapy interventions: abdominal trunk stabilization, activity modification, patient education, self care, therapeutic activities, therapeutic exercise and home exercise program        Subjective Evaluation    Pain  Current pain ratin  At best pain ratin  At worst pain ratin  Quality: dull ache          Objective     Special Questions      Additional Special Questions  No red flags present  Lower quarter screen unremarkable    Static Posture     Head  Forward  Shoulders  Rounded  Postural Observations  Seated posture: poor  Standing posture: fair  Correction of posture: makes symptoms better        Palpation   Left   Hypertonic in the erector spinae and quadratus lumborum  Hypotonic in the transverse abdominus  Right   Hypertonic in the erector spinae and quadratus lumborum  Hypotonic in the transverse abdominus       Neurological Testing     Sensation     Lumbar   Left   Intact: light touch    Right   Intact: light touch    Reflexes   Left   Patellar (L4): normal (2+)  Achilles (S1): normal (2+)  Babinski sign: negative  Clonus sign: negative    Right   Patellar (L4): normal (2+)  Achilles (S1): normal (2+)  Babinski sign: negative  Clonus sign: negative    Active Range of Motion     Lumbar   Flexion: with pain  Extension: with pain  Left Knee   Normal active range of motion    Right Knee   Normal active range of motion  Left Ankle/Foot   Normal active range of motion    Right Ankle/Foot   Normal active range of motion    Additional Active Range of Motion Details  Flexion 50%  Extension 50%  SB 50%  Rotation 50%    Passive Range of Motion   Left Hip   Internal rotation (prone): 40 degrees     Right Hip   Internal rotation (prone): 40 degrees     Strength/Myotome Testing     Lumbar   Left   Heel walk: normal  Toe walk: normal    Right   Heel walk: normal  Toe walk: normal    Left Hip   Planes of Motion   Flexion: WFL  Extension: WFL  Abduction: WFL  Adduction: WFL    Right Hip   Planes of Motion   Flexion: WFL  Extension: WFL  Abduction: WFL  Adduction: WFL    Left Knee   Flexion: WFL  Extension: WFL    Right Knee   Flexion: WFL  Extension: WFL    Left Ankle/Foot   Dorsiflexion: WFL  Plantar flexion: WFL  Inversion: WFL  Eversion: WFL  Great toe extension: WFL  Right Ankle/Foot   Dorsiflexion: WFL  Plantar flexion: WFL  Inversion: WFL  Eversion: WFL  Great toe extension: WFL  Muscle Activation     Additional Muscle Activation Details  Patient demonstrates poor activation of Transversus Abdominus and multifidus force couple and loss of feed forward mechanism with reaching tasks  Patient was able to perform activation with cueing  Tests     Lumbar   Negative repeated flexion and repeated extension  Left   Negative crossed SLR, Roman and passive SLR  Right   Negative crossed SLR, Roman and passive SLR  Left Pelvic Girdle/Sacrum   Negative: sacrum compression, sacral spring and thigh thrust      Right Pelvic Girdle/Sacrum   Negative: sacrum compression, sacral spring and thigh thrust      Left Hip   Negative ISABELLA, FADIR, Gaenslen's, scour, SI compression and SI distraction  SLR: Negative  Right Hip   Negative ISABELLA, FADIR, Gaenslen's, scour, SI compression and SI distraction  SLR: Negative       Ambulation   Weight-Bearing Status   Weight-Bearing Status (Left): full weight bearing   Weight-Bearing Status (Right): full weight-bearing      Observational Gait   Gait: within functional limits     Functional Assessment     Single Leg Stance   Left: 10 (EO & EC) seconds  Right: 10 (EO & EC) seconds          Precautions: fibromyalgia      Daily Treatment Diary patient was given HEP print out    Manual  8/3                                                                                 Exercise Diary              Hip flexor str             Hamstring str [id number]            Piriformis str [id number]            LTR [id number]            SKTC [id number]            SLR 20            Bridging with ball between knees 20            Clamshell with band 20            Standing hip abduction 20            Standing hip extension 20                                      Bike                                                                                                            Modalities                           Moist heat

## 2018-08-14 ENCOUNTER — OFFICE VISIT (OUTPATIENT)
Dept: OBGYN CLINIC | Facility: CLINIC | Age: 63
End: 2018-08-14
Payer: COMMERCIAL

## 2018-08-14 VITALS
WEIGHT: 154 LBS | DIASTOLIC BLOOD PRESSURE: 75 MMHG | SYSTOLIC BLOOD PRESSURE: 117 MMHG | BODY MASS INDEX: 29.07 KG/M2 | HEIGHT: 61 IN

## 2018-08-14 DIAGNOSIS — Z01.419 WOMEN'S ANNUAL ROUTINE GYNECOLOGICAL EXAMINATION: ICD-10-CM

## 2018-08-14 DIAGNOSIS — Z11.51 SCREENING FOR HUMAN PAPILLOMAVIRUS (HPV): Primary | ICD-10-CM

## 2018-08-14 PROCEDURE — G0145 SCR C/V CYTO,THINLAYER,RESCR: HCPCS | Performed by: OBSTETRICS & GYNECOLOGY

## 2018-08-14 PROCEDURE — 99386 PREV VISIT NEW AGE 40-64: CPT | Performed by: OBSTETRICS & GYNECOLOGY

## 2018-08-14 PROCEDURE — 87624 HPV HI-RISK TYP POOLED RSLT: CPT | Performed by: OBSTETRICS & GYNECOLOGY

## 2018-08-14 RX ORDER — DULOXETIN HYDROCHLORIDE 60 MG/1
CAPSULE, DELAYED RELEASE ORAL
COMMUNITY
Start: 2018-08-04 | End: 2018-11-16 | Stop reason: SDUPTHER

## 2018-08-14 NOTE — PROGRESS NOTES
A/p    1  Annual exam    Pt has no gyn complaints   No vaginal bleeding no discharge   Last pap 6/16/2015 - wnl    Reviewed the scheduling of paps and one done today     2  Bladder / lower ab pressure   Pt was diagnosed with a bone fragment in the lower back that is causing this  pressure   Since diagnosis she getting pt and exercise Is making better   No urge or stress incontinence     3  Health Maintentance       Pap -6/19/2015 wnl   Mammogram -7/2018 wnl    Colonoscopy - 6/11/2012 - WNL      64 y/o female with complaints of vaginal / bladder pressure  Pt was seen in the ED and was told she had a back bone spur that was pressing on nerve and causing the pain   The patient has since started PT and doing exercise and is better   Denies any loss of urine and or stool    Past medical / surgical / social / family history reviewed  Medication and allergies reviewed and updated   All scripts reviewed for dating and test results  Review of Systems - Negative except listed in HPI    /75 (BP Location: Right arm, Patient Position: Sitting, Cuff Size: Standard)   Ht 5' 1" (1 549 m)   Wt 69 9 kg (154 lb)   BMI 29 10 kg/m²     Physical Exam   Constitutional: She is oriented to person, place, and time  She appears well-developed  Eyes: Pupils are equal, round, and reactive to light  Neck: Normal range of motion  No tracheal deviation present  No thyromegaly present  Pulmonary/Chest: Effort normal  No respiratory distress  Right breast exhibits no nipple discharge, no skin change (scar from the reduction ) and no tenderness  Left breast exhibits no nipple discharge, no skin change and no tenderness  Breasts are symmetrical    Abdominal: Soft  She exhibits no distension and no mass  There is no tenderness  There is no rebound and no guarding  Genitourinary: Vagina normal and uterus normal  Cervix exhibits no motion tenderness, no discharge and no friability   Right adnexum displays no mass, no tenderness and no fullness  Left adnexum displays no mass, no tenderness and no fullness  No vaginal discharge found  Musculoskeletal: Normal range of motion  Lymphadenopathy:     She has no cervical adenopathy  Neurological: She is alert and oriented to person, place, and time  Skin: Skin is warm  Psychiatric: She has a normal mood and affect   Thought content normal

## 2018-08-14 NOTE — PATIENT INSTRUCTIONS
Pap Smear   GENERAL INFORMATION:   What is a Pap smear? A Pap smear, or Pap test, is a procedure to check your cervix for abnormal cells  The cervix is the narrow opening at the bottom of your uterus  The cervix meets the top part of the vagina  How do I prepare for a Pap smear? The best time to schedule the test is right after your period stops  Do not have a Pap smear during your monthly period  Do not have intercourse or put anything in your vagina for 24 hours before your test    What will happen during a Pap smear? · You will lie on your back and place your feet on footrests called stirrups  Your caregiver will gently insert a device called a speculum into your vagina  The speculum is used to spread the walls of your vagina so he can see your cervix  He will use a thin brush or cotton swab to collect cells from the inside of your cervix  · Your caregiver will also collect cells from the surface of your cervix with a plastic or wooden tool called a spatula  He may also gently scrape the upper part of your vagina for a sample  The samples are placed in a container with liquid or on a glass slide  They are sent to a lab and examined for abnormal cells  How often do I need a Pap smear? Pap smears are usually done every 1 to 3 years  You may need a Pap smear more often if you have any of the following:  · Positive test result for the human papillomavirus (HPV)    · Cervical intraepithelial neoplasm or cervical cancer    · HIV    · A weak immune system    · Exposure to diethylstilbestrol (INNA) medicine when your mother was pregnant with you  CARE AGREEMENT:   You have the right to help plan your care  Learn about your health condition and how it may be treated  Discuss treatment options with your caregivers to decide what care you want to receive  You always have the right to refuse treatment  The above information is an  only   It is not intended as medical advice for individual conditions or treatments  Talk to your doctor, nurse or pharmacist before following any medical regimen to see if it is safe and effective for you  © 2014 [address] is for End User's use only and may not be sold, redistributed or otherwise used for commercial purposes  All illustrations and images included in CareNotes® are the copyrighted property of A D [employer] M [employer]  or [employer]

## 2018-08-16 LAB
HPV HR 12 DNA CVX QL NAA+PROBE: NEGATIVE
HPV16 DNA CVX QL NAA+PROBE: NEGATIVE
HPV18 DNA CVX QL NAA+PROBE: NEGATIVE
LAB AP GYN PRIMARY INTERPRETATION: NORMAL
Lab: NORMAL
PATH INTERP SPEC-IMP: NORMAL

## 2018-09-10 DIAGNOSIS — E03.9 ACQUIRED HYPOTHYROIDISM: ICD-10-CM

## 2018-09-10 RX ORDER — LEVOTHYROXINE SODIUM 0.07 MG/1
TABLET ORAL
Qty: 90 TABLET | Refills: 1 | Status: SHIPPED | OUTPATIENT
Start: 2018-09-10 | End: 2019-08-15 | Stop reason: SDUPTHER

## 2018-11-16 DIAGNOSIS — F41.1 GENERALIZED ANXIETY DISORDER: Primary | ICD-10-CM

## 2018-11-18 RX ORDER — DULOXETIN HYDROCHLORIDE 60 MG/1
CAPSULE, DELAYED RELEASE ORAL
Qty: 90 CAPSULE | Refills: 4 | Status: SHIPPED | OUTPATIENT
Start: 2018-11-18 | End: 2018-11-20 | Stop reason: SDUPTHER

## 2018-11-20 DIAGNOSIS — F41.1 GENERALIZED ANXIETY DISORDER: ICD-10-CM

## 2018-11-20 RX ORDER — DULOXETIN HYDROCHLORIDE 60 MG/1
60 CAPSULE, DELAYED RELEASE ORAL DAILY
Qty: 90 CAPSULE | Refills: 0 | Status: SHIPPED | OUTPATIENT
Start: 2018-11-20 | End: 2019-11-26 | Stop reason: SDUPTHER

## 2019-01-08 ENCOUNTER — OFFICE VISIT (OUTPATIENT)
Dept: FAMILY MEDICINE CLINIC | Facility: CLINIC | Age: 64
End: 2019-01-08
Payer: COMMERCIAL

## 2019-01-08 VITALS
WEIGHT: 153.6 LBS | DIASTOLIC BLOOD PRESSURE: 70 MMHG | BODY MASS INDEX: 29 KG/M2 | SYSTOLIC BLOOD PRESSURE: 130 MMHG | TEMPERATURE: 98.7 F | HEIGHT: 61 IN

## 2019-01-08 DIAGNOSIS — G47.33 OBSTRUCTIVE SLEEP APNEA: ICD-10-CM

## 2019-01-08 DIAGNOSIS — Z12.11 SCREENING FOR COLON CANCER: ICD-10-CM

## 2019-01-08 DIAGNOSIS — E11.9 CONTROLLED TYPE 2 DIABETES MELLITUS WITHOUT COMPLICATION, WITHOUT LONG-TERM CURRENT USE OF INSULIN (HCC): Primary | ICD-10-CM

## 2019-01-08 DIAGNOSIS — E03.9 ACQUIRED HYPOTHYROIDISM: ICD-10-CM

## 2019-01-08 DIAGNOSIS — M79.7 FIBROMYALGIA: ICD-10-CM

## 2019-01-08 DIAGNOSIS — Z23 NEED FOR VACCINATION: ICD-10-CM

## 2019-01-08 DIAGNOSIS — E78.5 DYSLIPIDEMIA: ICD-10-CM

## 2019-01-08 DIAGNOSIS — F41.1 GENERALIZED ANXIETY DISORDER: ICD-10-CM

## 2019-01-08 PROBLEM — R79.89 ABNORMAL LIVER FUNCTION TESTS: Status: RESOLVED | Noted: 2018-07-17 | Resolved: 2019-01-08

## 2019-01-08 LAB — SL AMB POCT HEMOGLOBIN AIC: 6 (ref ?–6.5)

## 2019-01-08 PROCEDURE — 99214 OFFICE O/P EST MOD 30 MIN: CPT | Performed by: FAMILY MEDICINE

## 2019-01-08 PROCEDURE — 3044F HG A1C LEVEL LT 7.0%: CPT | Performed by: FAMILY MEDICINE

## 2019-01-08 PROCEDURE — 90471 IMMUNIZATION ADMIN: CPT | Performed by: FAMILY MEDICINE

## 2019-01-08 PROCEDURE — 90732 PPSV23 VACC 2 YRS+ SUBQ/IM: CPT | Performed by: FAMILY MEDICINE

## 2019-01-08 PROCEDURE — 3008F BODY MASS INDEX DOCD: CPT | Performed by: FAMILY MEDICINE

## 2019-01-08 PROCEDURE — 83036 HEMOGLOBIN GLYCOSYLATED A1C: CPT | Performed by: FAMILY MEDICINE

## 2019-01-08 PROCEDURE — 1036F TOBACCO NON-USER: CPT | Performed by: FAMILY MEDICINE

## 2019-01-08 NOTE — PROGRESS NOTES
Assessment/Plan:    Controlled type 2 diabetes mellitus without complication, without long-term current use of insulin (HCC)  HB A1c is 6 0 in the office today  Fasting sugar was quite good  Continue diet and exercise  Hypothyroidism  Therapeutic TSH at 2 5  Continue current medication  Recheck in 6 months  Obstructive sleep apnea  Doing well, continue CPAP  Dyslipidemia  Recheck lab in July  Diagnoses and all orders for this visit:    Controlled type 2 diabetes mellitus without complication, without long-term current use of insulin (HCC)  -     POCT hemoglobin A1c  -     Comprehensive metabolic panel; Future  -     Hemoglobin A1C; Future  -     Microalbumin / creatinine urine ratio; Future    Acquired hypothyroidism  -     TSH, 3rd generation; Future  -     T4; Future    Obstructive sleep apnea    Dyslipidemia  -     CBC and differential; Future  -     Comprehensive metabolic panel; Future  -     Lipid panel; Future  -     TSH, 3rd generation; Future    Fibromyalgia    Generalized anxiety disorder    Screening for colon cancer  -     Ambulatory referral to Colorectal Surgery; Future    Need for vaccination  -     PNEUMOCOCCAL POLYSACCHARIDE VACCINE 23-VALENT =>3YO SQ IM          Subjective:   Chief Complaint   Patient presents with    Follow-up    Diabetes    Hypothyroidism          Patient ID: Mica Zamora is a 59 y o  female  Patient is a 59-year-old female presenting to the office for follow-up on her diet-controlled diabetes, sleep apnea, dyslipidemia, hypothyroidism, anxiety, and a general checkup  She continues to follow up with Rheumatology for her fibromyalgia  Had lab work done earlier this year which is not available to me, patient states that this was normal  Recent lab work at  [facility] is reviewed  She will be due for colonoscopy this year  Mammogram from last year was normal   She would like to get a pneumonia shot today          The following portions of the patient's history were reviewed and updated as appropriate: allergies, current medications, past family history, past medical history, past social history, past surgical history and problem list     Review of Systems   Constitutional: Negative for activity change, chills, diaphoresis, fatigue, fever and unexpected weight change  HENT: Negative for congestion, ear pain, hearing loss, nosebleeds, postnasal drip, rhinorrhea, sinus pressure, sore throat and tinnitus  Eyes: Negative for photophobia, pain, discharge, redness and visual disturbance  Respiratory: Negative for cough, chest tightness, shortness of breath and wheezing  Cardiovascular: Negative for chest pain, palpitations and leg swelling  Denies NEWSOME   Gastrointestinal: Negative for abdominal pain, blood in stool, constipation, diarrhea, nausea and vomiting  Endocrine: Negative  Genitourinary: Negative for difficulty urinating, dysuria, frequency, hematuria and urgency  Recent Pap smear was reported as normal    Musculoskeletal: Negative for arthralgias, joint swelling and myalgias  Skin: Negative for rash and wound  Denies skin lesions, change in birthmarks   Allergic/Immunologic: Negative for environmental allergies, food allergies and immunocompromised state  Neurological: Negative for dizziness, tremors, seizures, syncope, weakness, numbness and headaches  Hematological: Negative  Psychiatric/Behavioral: Negative for behavioral problems, confusion, decreased concentration and sleep disturbance  The patient is not nervous/anxious  Objective:      /70   Temp 98 7 °F (37 1 °C)   Ht 5' 1" (1 549 m)   Wt 69 7 kg (153 lb 9 6 oz)   BMI 29 02 kg/m²          Physical Exam   Constitutional: She is oriented to person, place, and time  She appears well-developed and well-nourished  No distress  HENT:   Head: Normocephalic and atraumatic     Right Ear: External ear normal    Left Ear: External ear normal    Nose: Nose normal    Mouth/Throat: Oropharynx is clear and moist    Eyes: Pupils are equal, round, and reactive to light  Conjunctivae and EOM are normal    Neck: Normal range of motion  Neck supple  No JVD present  No tracheal deviation present  No thyromegaly present  Cardiovascular: Normal rate, regular rhythm and normal heart sounds  No murmur heard  Pulmonary/Chest: Effort normal and breath sounds normal  She has no wheezes  She has no rales  Abdominal: Soft  Bowel sounds are normal  She exhibits no mass  There is no tenderness  There is no rebound and no guarding  Musculoskeletal: Normal range of motion  She exhibits no edema or tenderness  Lymphadenopathy:     She has no cervical adenopathy  Neurological: She is alert and oriented to person, place, and time  She has normal reflexes  No cranial nerve deficit  Skin: Skin is warm and dry  No lesion and no rash noted  Psychiatric: She has a normal mood and affect  Judgment normal    Nursing note and vitals reviewed

## 2019-05-23 ENCOUNTER — OFFICE VISIT (OUTPATIENT)
Dept: FAMILY MEDICINE CLINIC | Facility: CLINIC | Age: 64
End: 2019-05-23
Payer: COMMERCIAL

## 2019-05-23 VITALS
WEIGHT: 156.4 LBS | HEIGHT: 61 IN | DIASTOLIC BLOOD PRESSURE: 82 MMHG | SYSTOLIC BLOOD PRESSURE: 120 MMHG | BODY MASS INDEX: 29.53 KG/M2

## 2019-05-23 DIAGNOSIS — M25.511 ACUTE PAIN OF RIGHT SHOULDER: Primary | ICD-10-CM

## 2019-05-23 PROCEDURE — 3008F BODY MASS INDEX DOCD: CPT | Performed by: FAMILY MEDICINE

## 2019-05-23 PROCEDURE — 1036F TOBACCO NON-USER: CPT | Performed by: FAMILY MEDICINE

## 2019-05-23 PROCEDURE — 99213 OFFICE O/P EST LOW 20 MIN: CPT | Performed by: FAMILY MEDICINE

## 2019-05-23 RX ORDER — PREDNISONE 10 MG/1
TABLET ORAL
Qty: 30 TABLET | Refills: 0 | Status: SHIPPED | OUTPATIENT
Start: 2019-05-23 | End: 2019-09-03

## 2019-05-31 ENCOUNTER — APPOINTMENT (OUTPATIENT)
Dept: RADIOLOGY | Age: 64
End: 2019-05-31
Payer: COMMERCIAL

## 2019-05-31 DIAGNOSIS — M25.511 ACUTE PAIN OF RIGHT SHOULDER: ICD-10-CM

## 2019-05-31 PROCEDURE — 73030 X-RAY EXAM OF SHOULDER: CPT

## 2019-06-03 ENCOUNTER — TELEPHONE (OUTPATIENT)
Dept: FAMILY MEDICINE CLINIC | Facility: CLINIC | Age: 64
End: 2019-06-03

## 2019-06-04 ENCOUNTER — TELEPHONE (OUTPATIENT)
Dept: FAMILY MEDICINE CLINIC | Facility: CLINIC | Age: 64
End: 2019-06-04

## 2019-06-07 LAB
LEFT EYE DIABETIC RETINOPATHY: NORMAL
RIGHT EYE DIABETIC RETINOPATHY: NORMAL

## 2019-06-07 PROCEDURE — 3072F LOW RISK FOR RETINOPATHY: CPT | Performed by: FAMILY MEDICINE

## 2019-08-15 DIAGNOSIS — E03.9 ACQUIRED HYPOTHYROIDISM: ICD-10-CM

## 2019-08-15 RX ORDER — LEVOTHYROXINE SODIUM 0.07 MG/1
TABLET ORAL
Qty: 90 TABLET | Refills: 1 | Status: SHIPPED | OUTPATIENT
Start: 2019-08-15 | End: 2019-09-03 | Stop reason: SDUPTHER

## 2019-08-31 LAB — HBA1C MFR BLD HPLC: 6.3 %

## 2019-09-03 ENCOUNTER — OFFICE VISIT (OUTPATIENT)
Dept: FAMILY MEDICINE CLINIC | Facility: CLINIC | Age: 64
End: 2019-09-03
Payer: COMMERCIAL

## 2019-09-03 VITALS
SYSTOLIC BLOOD PRESSURE: 130 MMHG | BODY MASS INDEX: 29.42 KG/M2 | HEIGHT: 61 IN | WEIGHT: 155.8 LBS | DIASTOLIC BLOOD PRESSURE: 82 MMHG

## 2019-09-03 DIAGNOSIS — E78.5 DYSLIPIDEMIA: ICD-10-CM

## 2019-09-03 DIAGNOSIS — E03.9 ACQUIRED HYPOTHYROIDISM: ICD-10-CM

## 2019-09-03 DIAGNOSIS — M25.511 CHRONIC RIGHT SHOULDER PAIN: ICD-10-CM

## 2019-09-03 DIAGNOSIS — G89.29 CHRONIC RIGHT SHOULDER PAIN: ICD-10-CM

## 2019-09-03 DIAGNOSIS — F41.1 GENERALIZED ANXIETY DISORDER: ICD-10-CM

## 2019-09-03 DIAGNOSIS — E11.9 CONTROLLED TYPE 2 DIABETES MELLITUS WITHOUT COMPLICATION, WITHOUT LONG-TERM CURRENT USE OF INSULIN (HCC): Primary | ICD-10-CM

## 2019-09-03 PROCEDURE — 99214 OFFICE O/P EST MOD 30 MIN: CPT | Performed by: FAMILY MEDICINE

## 2019-09-03 RX ORDER — LEVOTHYROXINE SODIUM 0.07 MG/1
75 TABLET ORAL DAILY
Qty: 90 TABLET | Refills: 1 | Status: SHIPPED | OUTPATIENT
Start: 2019-09-03 | End: 2020-03-20 | Stop reason: SDUPTHER

## 2019-09-03 NOTE — ASSESSMENT & PLAN NOTE
Lab Results   Component Value Date    HGBA1C 6 3 08/31/2019     Diet-controlled  Continue discretion, encouraged physical activity

## 2019-09-03 NOTE — ASSESSMENT & PLAN NOTE
Total cholesterol/HDL ratio is still favorable  Continue current dietary discretion    Recheck in 6 month

## 2019-09-03 NOTE — PROGRESS NOTES
Assessment/Plan:    Acquired hypothyroidism   Continue current medications  Recheck in 6 months    Chronic right shoulder pain   Will refer to orthopedics at this point  Controlled type 2 diabetes mellitus without complication, without long-term current use of insulin [facility]  Lab Results   Component Value Date    HGBA1C 6 3 08/31/2019     Diet-controlled  Continue discretion, encouraged physical activity  Dyslipidemia    Total cholesterol/HDL ratio is still favorable  Continue current dietary discretion  Recheck in 6 month    Generalized anxiety disorder   Fairly well controlled on Cymbalta 60 mg daily  Continue same  Recheck in 6 months  Diagnoses and all orders for this visit:    Controlled type 2 diabetes mellitus without complication, without long-term current use of insulin ([facility])  -     Hemoglobin A1C; Future  -     Comprehensive metabolic panel; Future    Acquired hypothyroidism  -     Comprehensive metabolic panel; Future  -     CBC and differential; Future  -     T4; Future  -     TSH, 3rd generation; Future  -     levothyroxine 75 mcg tablet; Take 1 tablet (75 mcg total) by mouth daily    Dyslipidemia  -     Comprehensive metabolic panel; Future  -     CBC and differential; Future  -     Lipid panel; Future    Generalized anxiety disorder    Chronic right shoulder pain  -     Ambulatory referral to Orthopedic Surgery; Future  -     CBC and differential; Future          Subjective:   Chief Complaint   Patient presents with    Diabetes    Hypothyroidism    Anxiety     refill thyroid med 90 day  review blood work           Patient ID: Elissa Lopez is a 59 y o  female  Patient is a 29-year-old female presenting to the office for follow-up on her diabetes, hyperlipidemia, anxiety, and a general checkup  She continues to have difficulty with her right shoulder  She would like to see a specialist at this point    She will be due for mammogram, she has an appointment set up with a new OB gyn in the near future  She has a referral for her colonoscopy, she is trying to set that up  The following portions of the patient's history were reviewed and updated as appropriate: allergies, current medications, past family history, past medical history, past social history, past surgical history and problem list     Review of Systems   Constitutional: Negative for activity change, chills, diaphoresis, fatigue, fever and unexpected weight change  HENT: Negative for congestion, ear pain, hearing loss, nosebleeds, postnasal drip, rhinorrhea, sinus pressure, sore throat and tinnitus  Eyes: Negative for photophobia, pain, discharge, redness and visual disturbance  Respiratory: Negative for cough, chest tightness, shortness of breath and wheezing  Cardiovascular: Negative for chest pain, palpitations and leg swelling  Denies NEWSOME   Gastrointestinal: Negative for abdominal pain, blood in stool, constipation, diarrhea, nausea and vomiting  Endocrine: Negative  Genitourinary: Negative for difficulty urinating, dysuria, frequency, hematuria and urgency  Denies dysmenorrhea, menstrual difficulties   Musculoskeletal: Positive for arthralgias, joint swelling and myalgias  Skin: Negative for rash and wound  Denies skin lesions, change in birthmarks   Allergic/Immunologic: Negative for environmental allergies, food allergies and immunocompromised state  Neurological: Negative for dizziness, tremors, seizures, syncope, weakness, numbness and headaches  Hematological: Negative  Psychiatric/Behavioral: Negative for behavioral problems, confusion, decreased concentration and sleep disturbance  The patient is not nervous/anxious  Objective:      /82   Ht 5' 1" (1 549 m)   Wt 70 7 kg (155 lb 12 8 oz)   BMI 29 44 kg/m²          Physical Exam   Constitutional: She is oriented to person, place, and time  She appears well-developed and well-nourished  No distress  HENT:   Head: Normocephalic and atraumatic  Right Ear: External ear normal    Left Ear: External ear normal    Nose: Nose normal    Mouth/Throat: Oropharynx is clear and moist    Eyes: Pupils are equal, round, and reactive to light  Conjunctivae and EOM are normal    Neck: Normal range of motion  Neck supple  No JVD present  No tracheal deviation present  No thyromegaly present  Cardiovascular: Normal rate, regular rhythm and normal heart sounds  No murmur heard  Pulmonary/Chest: Effort normal and breath sounds normal  She has no wheezes  She has no rales  Abdominal: Soft  Bowel sounds are normal  She exhibits no mass  There is no tenderness  There is no rebound and no guarding  Musculoskeletal: She exhibits no edema  Right shoulder: She exhibits decreased range of motion and tenderness  Lymphadenopathy:     She has no cervical adenopathy  Neurological: She is alert and oriented to person, place, and time  She has normal reflexes  No cranial nerve deficit  Skin: Skin is warm and dry  No lesion and no rash noted  Psychiatric: She has a normal mood and affect   Judgment normal

## 2019-10-03 ENCOUNTER — ANNUAL EXAM (OUTPATIENT)
Dept: OBGYN CLINIC | Facility: CLINIC | Age: 64
End: 2019-10-03
Payer: COMMERCIAL

## 2019-10-03 VITALS
DIASTOLIC BLOOD PRESSURE: 70 MMHG | SYSTOLIC BLOOD PRESSURE: 128 MMHG | BODY MASS INDEX: 29.64 KG/M2 | WEIGHT: 157 LBS | HEIGHT: 61 IN

## 2019-10-03 DIAGNOSIS — Z12.31 ENCOUNTER FOR SCREENING MAMMOGRAM FOR BREAST CANCER: ICD-10-CM

## 2019-10-03 DIAGNOSIS — Z01.419 ENCOUNTER FOR ANNUAL ROUTINE GYNECOLOGICAL EXAMINATION: Primary | ICD-10-CM

## 2019-10-03 DIAGNOSIS — B96.89 BV (BACTERIAL VAGINOSIS): ICD-10-CM

## 2019-10-03 DIAGNOSIS — N89.8 VAGINAL DISCHARGE: ICD-10-CM

## 2019-10-03 DIAGNOSIS — N76.0 BV (BACTERIAL VAGINOSIS): ICD-10-CM

## 2019-10-03 PROCEDURE — 99396 PREV VISIT EST AGE 40-64: CPT | Performed by: OBSTETRICS & GYNECOLOGY

## 2019-10-03 PROCEDURE — 87210 SMEAR WET MOUNT SALINE/INK: CPT | Performed by: OBSTETRICS & GYNECOLOGY

## 2019-10-03 RX ORDER — METRONIDAZOLE 7.5 MG/G
1 GEL VAGINAL
Qty: 70 G | Refills: 0 | Status: SHIPPED | OUTPATIENT
Start: 2019-10-03 | End: 2019-10-08

## 2019-10-03 RX ORDER — LEVOTHYROXINE SODIUM 0.07 MG/1
TABLET ORAL
COMMUNITY
End: 2019-10-15 | Stop reason: SDUPTHER

## 2019-10-03 NOTE — PROGRESS NOTES
Assessment/Plan:    pap is up to date    mammogram reviewed with her including breast density  RX given so that she can schedule this     Discussed self breast exams    colon cancer screening-colonoscopy was 10 years ago, she is due and will look into getting this scheduled  Bacterial vaginosis-I reviewed this with her  Prescription sent for Metrogel  discussed preventive care, regular exercise and a healthy diet      No problem-specific Assessment & Plan notes found for this encounter  Diagnoses and all orders for this visit:    Encounter for annual routine gynecological examination    Encounter for screening mammogram for breast cancer  -     Mammo screening bilateral w 3d & cad; Future    Vaginal discharge  -     POCT wet mount    Other orders  -     levothyroxine 75 mcg tablet; levothyroxine 75 mcg tablet  -     Calcium Carbonate (CALTRATE 600) 1500 (600 Ca) MG TABS; Take by mouth          Subjective:      Patient ID: Trinna Spurling is a 59 y o  female  Patient here for yearly  She has no gyn complaints  Normal Pap and negative HPV in 2018, suggestion of BV noted  She has no symptoms of discharge or odor  She is due for a mammogram   She had a normal 3D mammogram in July of 2018 that showed scattered fibroglandular densities  The following portions of the patient's history were reviewed and updated as appropriate: allergies, current medications, past family history, past medical history, past social history, past surgical history and problem list     Review of Systems   Constitutional: Negative  Gastrointestinal: Negative  Genitourinary: Negative  Objective:      /70 (BP Location: Left arm, Patient Position: Sitting, Cuff Size: Standard)   Ht 5' 1" (1 549 m)   Wt 71 2 kg (157 lb)   BMI 29 66 kg/m²          Physical Exam   Constitutional: She appears well-developed  Neck: No tracheal deviation present  No thyromegaly present     Cardiovascular: Normal rate and regular rhythm  Pulmonary/Chest: Effort normal and breath sounds normal  Right breast exhibits no inverted nipple, no mass, no nipple discharge, no skin change and no tenderness  Left breast exhibits no inverted nipple, no mass, no nipple discharge, no skin change and no tenderness  Breasts are symmetrical    Examined seated and supine   Abdominal: Soft  She exhibits no distension and no mass  There is no tenderness  Genitourinary: Rectum normal, vagina normal and uterus normal  No labial fusion  There is no rash, tenderness, lesion or injury on the right labia  There is no rash, tenderness, lesion or injury on the left labia  Cervix exhibits no motion tenderness, no discharge and no friability  Right adnexum displays no mass, no tenderness and no fullness  Left adnexum displays no mass, no tenderness and no fullness  Genitourinary Comments: Small amount of white discharge noted   Vitals reviewed

## 2019-10-15 ENCOUNTER — OFFICE VISIT (OUTPATIENT)
Dept: OBGYN CLINIC | Facility: MEDICAL CENTER | Age: 64
End: 2019-10-15
Payer: COMMERCIAL

## 2019-10-15 VITALS
SYSTOLIC BLOOD PRESSURE: 145 MMHG | BODY MASS INDEX: 28.71 KG/M2 | DIASTOLIC BLOOD PRESSURE: 97 MMHG | WEIGHT: 156 LBS | HEIGHT: 62 IN | HEART RATE: 88 BPM

## 2019-10-15 DIAGNOSIS — M75.81 ROTATOR CUFF TENDONITIS, RIGHT: ICD-10-CM

## 2019-10-15 PROCEDURE — 99203 OFFICE O/P NEW LOW 30 MIN: CPT | Performed by: ORTHOPAEDIC SURGERY

## 2019-10-15 NOTE — PROGRESS NOTES
Orthopaedic Surgery - Office Note  Kalpana Cummings (89 y o  female)   : 1955   MRN: [id number]  Encounter Date: 10/15/2019    Chief Complaint   Patient presents with    Right Shoulder - Pain       Assessment / Plan  Right shoulder rotator cuff tendonitis with suspected subscapularis tear     · Begin outpatient PT for scapular stabilizing exercises for a HEP   · We did discuss doing these exercises daily   Return in about 6 weeks (around 2019)  History of Present Illness  Kalpana Cummings is a 59 y o  female who presents with right shoulder pain that has been ongoing for 2-3 years  She previously had 2 previous injections into her right shoulder 2 years ago which did provide her with partial temporarily relief  She describes difficulties getting dressed and lifting items  She describes difficulties sleeping at night time due to her pain  She states that she also cannot sleep on her right side due to her pain  She is a medical assistant  She states that she does take NSAIDs however she does not notice much relief with this  She denies neck pain and numbness and tingling  Review of Systems  Pertinent items are noted in HPI  All other systems were reviewed and are negative  Physical Exam  /97   Pulse 88   Ht 5' 2" (1 575 m)   Wt 70 8 kg (156 lb)   BMI 28 53 kg/m²   Cons: Appears well  No apparent distress  Psych: Alert  Oriented x3  Mood and affect normal   Eyes: PERRLA, EOMI  Resp: Normal effort  No audible wheezing or stridor  CV: Palpable pulse  No discernable arrhythmia  No LE edema  Lymph:  No palpable cervical, axillary, or inguinal lymphadenopathy  Skin: Warm  No palpable masses  No visible lesions  Neuro: Normal muscle tone  Normal and symmetric DTR's  Right Shoulder Exam  Alignment / Posture:  Normal shoulder posture  Inspection:  No swelling  No erythema  Palpation:  subacromial bursa and bicipital groove  tenderness  ROM:  Shoulder    Shoulder ER 70  Shoulder IR L4  Strength:  5/5 supraspinatus, 5/5 infraspinatus, and 4+/5 subscapularis  Stability:  No objective shoulder instability  Tests: (+) Speed  (-) [place]  (-) Cross-body adduction  (-) Spurling  Neurovascular:  Sensation intact in Ax/R/M/U nerve distributions  2+ radial pulse  Studies Reviewed  I have personally reviewed pertinent films in PACS and my interpretation is xray of right shoulder on 19 demonstrates no acute fractures or degenerative changes  Procedures  No procedures today  Medical, Surgical, Family, and Social History  The patient's medical history, family history, and social history, were reviewed and updated as appropriate  Past Medical History:   Diagnosis Date    Cervical somatic dysfunction     last assessed 3/3/2017    Diabetes mellitus ([facility] [address] )     last assessed 2017    Endometrial polyp     last assessed 11/10/2014    Fibroids, submucosal 2015    [id number] anteriorly by USG    Herpes simplex     Hypercholesterolemia     resolved 2015    Postmenopausal bleeding     last assessed 11/10/2014, endometrial polyp, D&C 10/14    Sleep disturbance     last assessed 3/5/2012    Varicella        Past Surgical History:   Procedure Laterality Date    BREAST SURGERY Bilateral     2014  reduction procedure     SECTION      fetal intolerance    COLONOSCOPY      resolved 2012, Dr Logan Castle - normal repeat in     DILATION AND CURETTAGE OF UTERUS  2014    PMB, polyp path;  endometrial polyp with focal tubular hyperplasia, benign, inactive endometrial curettings    HYSTEROSCOPY  2014    path; benign    TUBAL LIGATION Bilateral        Family History   Problem Relation Age of Onset    Coronary artery disease Mother     Diabetes Mother         DM    Hyperlipidemia Mother         hypercholesterolemia       Social History     Occupational History    Occupation: medical assistant   Tobacco Use    Smoking status: Never Smoker    Smokeless tobacco: Never Used   Substance and Sexual Activity    Alcohol use: No     Comment: history    Drug use: No    Sexual activity: Not Currently     Partners: Male     Birth control/protection: Post-menopausal       Allergies   Allergen Reactions    No Active Allergies          Current Outpatient Medications:     Calcium Carbonate (CALTRATE 600) 1500 (600 Ca) MG TABS, Take by mouth, Disp: , Rfl:     DULoxetine (CYMBALTA) 60 mg delayed release capsule, Take 1 capsule (60 mg total) by mouth daily, Disp: 90 capsule, Rfl: 0    levothyroxine 75 mcg tablet, Take 1 tablet (75 mcg total) by mouth daily (Patient taking differently: Take 125 mcg by mouth daily ), Disp: 90 tablet, Rfl: 1    aspirin 81 MG tablet, Take 81 mg by mouth every other day , Disp: , Rfl:     Scribe Attestation    I,:   Teetee Fagan am acting as a scribe while in the presence of the attending physician :        I,:   Ernesto Nettles MD personally performed the services described in this documentation    as scribed in my presence :

## 2019-11-26 DIAGNOSIS — F41.1 GENERALIZED ANXIETY DISORDER: ICD-10-CM

## 2019-11-26 RX ORDER — DULOXETIN HYDROCHLORIDE 60 MG/1
60 CAPSULE, DELAYED RELEASE ORAL DAILY
Qty: 90 CAPSULE | Refills: 0 | Status: SHIPPED | OUTPATIENT
Start: 2019-11-26 | End: 2020-02-27 | Stop reason: SDUPTHER

## 2020-02-15 ENCOUNTER — APPOINTMENT (OUTPATIENT)
Dept: LAB | Facility: HOSPITAL | Age: 65
End: 2020-02-15
Payer: COMMERCIAL

## 2020-02-15 DIAGNOSIS — E03.9 ACQUIRED HYPOTHYROIDISM: ICD-10-CM

## 2020-02-15 DIAGNOSIS — G89.29 CHRONIC RIGHT SHOULDER PAIN: ICD-10-CM

## 2020-02-15 DIAGNOSIS — E11.9 CONTROLLED TYPE 2 DIABETES MELLITUS WITHOUT COMPLICATION, WITHOUT LONG-TERM CURRENT USE OF INSULIN (HCC): ICD-10-CM

## 2020-02-15 DIAGNOSIS — E78.5 DYSLIPIDEMIA: ICD-10-CM

## 2020-02-15 DIAGNOSIS — M25.511 CHRONIC RIGHT SHOULDER PAIN: ICD-10-CM

## 2020-02-15 LAB
ALBUMIN SERPL BCP-MCNC: 3.9 G/DL (ref 3.5–5)
ALP SERPL-CCNC: 84 U/L (ref 46–116)
ALT SERPL W P-5'-P-CCNC: 49 U/L (ref 12–78)
ANION GAP SERPL CALCULATED.3IONS-SCNC: 10 MMOL/L (ref 4–13)
AST SERPL W P-5'-P-CCNC: 29 U/L (ref 5–45)
BASOPHILS # BLD AUTO: 0.03 THOUSANDS/ΜL (ref 0–0.1)
BASOPHILS NFR BLD AUTO: 1 % (ref 0–1)
BILIRUB SERPL-MCNC: 0.34 MG/DL (ref 0.2–1)
BUN SERPL-MCNC: 18 MG/DL (ref 5–25)
CALCIUM SERPL-MCNC: 9.3 MG/DL (ref 8.3–10.1)
CHLORIDE SERPL-SCNC: 103 MMOL/L (ref 100–108)
CHOLEST SERPL-MCNC: 225 MG/DL (ref 50–200)
CO2 SERPL-SCNC: 28 MMOL/L (ref 21–32)
CREAT SERPL-MCNC: 0.98 MG/DL (ref 0.6–1.3)
EOSINOPHIL # BLD AUTO: 0.09 THOUSAND/ΜL (ref 0–0.61)
EOSINOPHIL NFR BLD AUTO: 1 % (ref 0–6)
ERYTHROCYTE [DISTWIDTH] IN BLOOD BY AUTOMATED COUNT: 14.9 % (ref 11.6–15.1)
EST. AVERAGE GLUCOSE BLD GHB EST-MCNC: 131 MG/DL
GFR SERPL CREATININE-BSD FRML MDRD: 62 ML/MIN/1.73SQ M
GLUCOSE P FAST SERPL-MCNC: 106 MG/DL (ref 65–99)
HBA1C MFR BLD: 6.2 %
HCT VFR BLD AUTO: 46.1 % (ref 34.8–46.1)
HDLC SERPL-MCNC: 53 MG/DL
HGB BLD-MCNC: 14.4 G/DL (ref 11.5–15.4)
IMM GRANULOCYTES # BLD AUTO: 0.01 THOUSAND/UL (ref 0–0.2)
IMM GRANULOCYTES NFR BLD AUTO: 0 % (ref 0–2)
LDLC SERPL CALC-MCNC: 149 MG/DL (ref 0–100)
LYMPHOCYTES # BLD AUTO: 3.02 THOUSANDS/ΜL (ref 0.6–4.47)
LYMPHOCYTES NFR BLD AUTO: 46 % (ref 14–44)
MCH RBC QN AUTO: 27.4 PG (ref 26.8–34.3)
MCHC RBC AUTO-ENTMCNC: 31.2 G/DL (ref 31.4–37.4)
MCV RBC AUTO: 88 FL (ref 82–98)
MONOCYTES # BLD AUTO: 0.62 THOUSAND/ΜL (ref 0.17–1.22)
MONOCYTES NFR BLD AUTO: 10 % (ref 4–12)
NEUTROPHILS # BLD AUTO: 2.71 THOUSANDS/ΜL (ref 1.85–7.62)
NEUTS SEG NFR BLD AUTO: 42 % (ref 43–75)
NONHDLC SERPL-MCNC: 172 MG/DL
NRBC BLD AUTO-RTO: 0 /100 WBCS
PLATELET # BLD AUTO: 298 THOUSANDS/UL (ref 149–390)
PMV BLD AUTO: 11.2 FL (ref 8.9–12.7)
POTASSIUM SERPL-SCNC: 4.5 MMOL/L (ref 3.5–5.3)
PROT SERPL-MCNC: 8 G/DL (ref 6.4–8.2)
RBC # BLD AUTO: 5.25 MILLION/UL (ref 3.81–5.12)
SODIUM SERPL-SCNC: 141 MMOL/L (ref 136–145)
T4 SERPL-MCNC: 6.9 UG/DL (ref 4.7–13.3)
TRIGL SERPL-MCNC: 113 MG/DL
TSH SERPL DL<=0.05 MIU/L-ACNC: 2.26 UIU/ML (ref 0.36–3.74)
WBC # BLD AUTO: 6.48 THOUSAND/UL (ref 4.31–10.16)

## 2020-02-15 PROCEDURE — 83036 HEMOGLOBIN GLYCOSYLATED A1C: CPT

## 2020-02-15 PROCEDURE — 3044F HG A1C LEVEL LT 7.0%: CPT | Performed by: FAMILY MEDICINE

## 2020-02-15 PROCEDURE — 84436 ASSAY OF TOTAL THYROXINE: CPT

## 2020-02-15 PROCEDURE — 80061 LIPID PANEL: CPT

## 2020-02-15 PROCEDURE — 85025 COMPLETE CBC W/AUTO DIFF WBC: CPT

## 2020-02-15 PROCEDURE — 84443 ASSAY THYROID STIM HORMONE: CPT

## 2020-02-15 PROCEDURE — 36415 COLL VENOUS BLD VENIPUNCTURE: CPT

## 2020-02-15 PROCEDURE — 80053 COMPREHEN METABOLIC PANEL: CPT

## 2020-02-27 DIAGNOSIS — F41.1 GENERALIZED ANXIETY DISORDER: ICD-10-CM

## 2020-02-27 RX ORDER — DULOXETIN HYDROCHLORIDE 60 MG/1
60 CAPSULE, DELAYED RELEASE ORAL DAILY
Qty: 90 CAPSULE | Refills: 0 | Status: SHIPPED | OUTPATIENT
Start: 2020-02-27 | End: 2020-03-20 | Stop reason: SDUPTHER

## 2020-03-20 ENCOUNTER — OFFICE VISIT (OUTPATIENT)
Dept: FAMILY MEDICINE CLINIC | Facility: CLINIC | Age: 65
End: 2020-03-20
Payer: COMMERCIAL

## 2020-03-20 VITALS
DIASTOLIC BLOOD PRESSURE: 80 MMHG | WEIGHT: 160.38 LBS | HEIGHT: 62 IN | BODY MASS INDEX: 29.51 KG/M2 | SYSTOLIC BLOOD PRESSURE: 128 MMHG

## 2020-03-20 DIAGNOSIS — Z12.11 SCREENING FOR COLON CANCER: ICD-10-CM

## 2020-03-20 DIAGNOSIS — E78.5 DYSLIPIDEMIA: ICD-10-CM

## 2020-03-20 DIAGNOSIS — E11.9 CONTROLLED TYPE 2 DIABETES MELLITUS WITHOUT COMPLICATION, WITHOUT LONG-TERM CURRENT USE OF INSULIN (HCC): Primary | ICD-10-CM

## 2020-03-20 DIAGNOSIS — F41.1 GENERALIZED ANXIETY DISORDER: ICD-10-CM

## 2020-03-20 DIAGNOSIS — E03.9 ACQUIRED HYPOTHYROIDISM: ICD-10-CM

## 2020-03-20 DIAGNOSIS — E55.9 VITAMIN D DEFICIENCY: ICD-10-CM

## 2020-03-20 PROCEDURE — 3044F HG A1C LEVEL LT 7.0%: CPT | Performed by: FAMILY MEDICINE

## 2020-03-20 PROCEDURE — 2022F DILAT RTA XM EVC RTNOPTHY: CPT | Performed by: FAMILY MEDICINE

## 2020-03-20 PROCEDURE — 99214 OFFICE O/P EST MOD 30 MIN: CPT | Performed by: FAMILY MEDICINE

## 2020-03-20 PROCEDURE — 1036F TOBACCO NON-USER: CPT | Performed by: FAMILY MEDICINE

## 2020-03-20 PROCEDURE — 3008F BODY MASS INDEX DOCD: CPT | Performed by: FAMILY MEDICINE

## 2020-03-20 RX ORDER — DULOXETIN HYDROCHLORIDE 60 MG/1
60 CAPSULE, DELAYED RELEASE ORAL DAILY
Qty: 90 CAPSULE | Refills: 0 | Status: SHIPPED | OUTPATIENT
Start: 2020-03-20 | End: 2020-06-02 | Stop reason: SDUPTHER

## 2020-03-20 RX ORDER — LEVOTHYROXINE SODIUM 0.12 MG/1
125 TABLET ORAL DAILY
Qty: 90 TABLET | Refills: 1 | Status: SHIPPED | OUTPATIENT
Start: 2020-03-20 | End: 2020-03-26 | Stop reason: SDUPTHER

## 2020-03-20 NOTE — ASSESSMENT & PLAN NOTE
Lab Results   Component Value Date    HGBA1C 6 2 (H) 02/15/2020     Diet-controlled, up-to-date with eye exam   Will send for diabetic education and nutritional services  Patient states she is having some problems with meal preparation    Recheck in 6 months

## 2020-03-20 NOTE — PROGRESS NOTES
Assessment/Plan:    Controlled type 2 diabetes mellitus without complication, without long-term current use of insulin ([facility])    Lab Results   Component Value Date    HGBA1C 6 2 (H) 02/15/2020     Diet-controlled, up-to-date with eye exam   Will send for diabetic education and nutritional services  Patient states she is having some problems with meal preparation  Recheck in 6 months    Dyslipidemia   Continue dietary modifications  Recheck in 6 months    Generalized anxiety disorder   Well controlled on Cymbalta 60 mg daily  Continue same  Recheck in 6 months       Diagnoses and all orders for this visit:    Controlled type 2 diabetes mellitus without complication, without long-term current use of insulin ([facility])  -     Comprehensive metabolic panel; Future  -     CBC and differential; Future  -     Hemoglobin A1C; Future  -     Ambulatory referral to Diabetic Education; Future    Acquired hypothyroidism  -     levothyroxine 125 mcg tablet; Take 1 tablet (125 mcg total) by mouth daily  -     TSH, 3rd generation; Future  -     T4; Future    Dyslipidemia  -     Lipid panel; Future  -     Comprehensive metabolic panel; Future  -     TSH, 3rd generation; Future    Generalized anxiety disorder  -     DULoxetine (CYMBALTA) 60 mg delayed release capsule; Take 1 capsule (60 mg total) by mouth daily    Vitamin D deficiency  -     Vitamin D 25 hydroxy; Future    Screening for colon cancer  -     Ambulatory referral to Colorectal Surgery; Future          Subjective:   Chief Complaint   Patient presents with    Diabetes     checkup        Patient ID: Dannie Christian is a 62 y o  female  She is here for follow-up on her diet-controlled diabetes, diet-controlled hyperlipidemia, anxiety, and a general checkup  Her last colonoscopy was 10 years ago, she would like to get it done again  She would like to see a different doctor at this time  She continues to do well on Cymbalta  Had a recent eye exam, no diabetic changes  She tries eat sensibly, but she has difficulty with meal preparation and would like to see someone from nutritional services or diabetic educator  The following portions of the patient's history were reviewed and updated as appropriate: allergies, current medications, past family history, past medical history, past social history, past surgical history and problem list     Review of Systems   Constitutional: Negative for activity change, chills, diaphoresis, fatigue, fever and unexpected weight change  HENT: Negative for congestion, ear pain, hearing loss, nosebleeds, postnasal drip, rhinorrhea, sinus pressure, sore throat and tinnitus  Eyes: Negative for photophobia, pain, discharge, redness and visual disturbance  Respiratory: Negative for cough, chest tightness, shortness of breath and wheezing  Cardiovascular: Negative for chest pain, palpitations and leg swelling  Denies NEWSOME   Gastrointestinal: Negative for abdominal pain, blood in stool, constipation, diarrhea, nausea and vomiting  Endocrine: Negative  Genitourinary: Negative for difficulty urinating, dysuria, frequency, hematuria and urgency  Denies dysmenorrhea, menstrual difficulties   Musculoskeletal: Positive for arthralgias (  Hands and fingers) and joint swelling ( hands and fingers)  Negative for myalgias  Skin: Negative for rash and wound  Denies skin lesions, change in birthmarks   Allergic/Immunologic: Negative for environmental allergies, food allergies and immunocompromised state  Neurological: Negative for dizziness, tremors, seizures, syncope, weakness, numbness and headaches  Hematological: Negative  Psychiatric/Behavioral: Negative for behavioral problems, confusion, decreased concentration and sleep disturbance  The patient is not nervous/anxious            Objective:      /80   Ht 5' 2" (1 575 m)   Wt 72 7 kg (160 lb 6 oz)   BMI 29 33 kg/m²          Physical Exam   Constitutional: She is oriented to person, place, and time  She appears well-developed and well-nourished  No distress  Over weight   HENT:   Head: Normocephalic and atraumatic  Right Ear: External ear normal    Left Ear: External ear normal    Nose: Nose normal    Mouth/Throat: Oropharynx is clear and moist    Eyes: Pupils are equal, round, and reactive to light  Conjunctivae and EOM are normal    Neck: Normal range of motion  Neck supple  No JVD present  No tracheal deviation present  No thyromegaly present  Cardiovascular: Normal rate, regular rhythm and normal heart sounds  No murmur heard  Pulmonary/Chest: Effort normal and breath sounds normal  She has no wheezes  She has no rales  Abdominal: Soft  Bowel sounds are normal  She exhibits no mass  There is no tenderness  There is no rebound and no guarding  Musculoskeletal: Normal range of motion  She exhibits deformity ( arthritic changes of the MCPs, PIP is, DI Ps of bilateral hands  )  She exhibits no edema or tenderness  Lymphadenopathy:     She has no cervical adenopathy  Neurological: She is alert and oriented to person, place, and time  She has normal reflexes  No cranial nerve deficit  Skin: Skin is warm and dry  No lesion and no rash noted  Psychiatric: She has a normal mood and affect   Judgment normal

## 2020-03-26 DIAGNOSIS — E03.9 ACQUIRED HYPOTHYROIDISM: ICD-10-CM

## 2020-03-26 RX ORDER — LEVOTHYROXINE SODIUM 0.12 MG/1
125 TABLET ORAL DAILY
Qty: 90 TABLET | Refills: 1 | Status: SHIPPED | OUTPATIENT
Start: 2020-03-26 | End: 2020-09-04

## 2020-05-07 ENCOUNTER — OFFICE VISIT (OUTPATIENT)
Dept: URGENT CARE | Age: 65
End: 2020-05-07
Payer: COMMERCIAL

## 2020-05-07 VITALS
WEIGHT: 158 LBS | HEART RATE: 95 BPM | TEMPERATURE: 97.1 F | SYSTOLIC BLOOD PRESSURE: 135 MMHG | RESPIRATION RATE: 16 BRPM | BODY MASS INDEX: 29.08 KG/M2 | OXYGEN SATURATION: 98 % | HEIGHT: 62 IN | DIASTOLIC BLOOD PRESSURE: 81 MMHG

## 2020-05-07 DIAGNOSIS — H92.01 RIGHT EAR PAIN: ICD-10-CM

## 2020-05-07 DIAGNOSIS — H61.21 IMPACTED CERUMEN OF RIGHT EAR: Primary | ICD-10-CM

## 2020-05-07 PROCEDURE — 99213 OFFICE O/P EST LOW 20 MIN: CPT | Performed by: NURSE PRACTITIONER

## 2020-05-07 RX ORDER — METHYLPREDNISOLONE 4 MG/1
4 TABLET ORAL 3 TIMES DAILY
Qty: 21 TABLET | Refills: 0 | Status: SHIPPED | OUTPATIENT
Start: 2020-05-07 | End: 2020-05-14

## 2020-05-09 ENCOUNTER — OFFICE VISIT (OUTPATIENT)
Dept: URGENT CARE | Age: 65
End: 2020-05-09
Payer: COMMERCIAL

## 2020-05-09 VITALS
RESPIRATION RATE: 18 BRPM | OXYGEN SATURATION: 97 % | TEMPERATURE: 97.6 F | WEIGHT: 158 LBS | SYSTOLIC BLOOD PRESSURE: 132 MMHG | DIASTOLIC BLOOD PRESSURE: 82 MMHG | BODY MASS INDEX: 29.08 KG/M2 | HEIGHT: 62 IN | HEART RATE: 91 BPM

## 2020-05-09 DIAGNOSIS — H61.21 IMPACTED CERUMEN OF RIGHT EAR: Primary | ICD-10-CM

## 2020-05-09 DIAGNOSIS — H60.501 ACUTE OTITIS EXTERNA OF RIGHT EAR, UNSPECIFIED TYPE: ICD-10-CM

## 2020-05-09 DIAGNOSIS — H66.91 RIGHT OTITIS MEDIA, UNSPECIFIED OTITIS MEDIA TYPE: ICD-10-CM

## 2020-05-09 PROCEDURE — 99213 OFFICE O/P EST LOW 20 MIN: CPT | Performed by: PHYSICIAN ASSISTANT

## 2020-05-09 RX ORDER — AMOXICILLIN 500 MG/1
500 CAPSULE ORAL EVERY 8 HOURS SCHEDULED
Qty: 30 CAPSULE | Refills: 0 | Status: SHIPPED | OUTPATIENT
Start: 2020-05-09 | End: 2020-05-19

## 2020-06-02 DIAGNOSIS — F41.1 GENERALIZED ANXIETY DISORDER: ICD-10-CM

## 2020-06-02 RX ORDER — DULOXETIN HYDROCHLORIDE 60 MG/1
60 CAPSULE, DELAYED RELEASE ORAL DAILY
Qty: 90 CAPSULE | Refills: 0 | Status: SHIPPED | OUTPATIENT
Start: 2020-06-02 | End: 2020-09-02

## 2020-07-13 ENCOUNTER — TELEPHONE (OUTPATIENT)
Dept: FAMILY MEDICINE CLINIC | Facility: CLINIC | Age: 65
End: 2020-07-13

## 2020-09-01 DIAGNOSIS — F41.1 GENERALIZED ANXIETY DISORDER: ICD-10-CM

## 2020-09-02 RX ORDER — DULOXETIN HYDROCHLORIDE 60 MG/1
CAPSULE, DELAYED RELEASE ORAL
Qty: 90 CAPSULE | Refills: 1 | Status: SHIPPED | OUTPATIENT
Start: 2020-09-02 | End: 2021-03-02 | Stop reason: SDUPTHER

## 2020-09-04 DIAGNOSIS — E03.9 ACQUIRED HYPOTHYROIDISM: ICD-10-CM

## 2020-09-04 RX ORDER — LEVOTHYROXINE SODIUM 0.12 MG/1
TABLET ORAL
Qty: 90 TABLET | Refills: 1 | Status: SHIPPED | OUTPATIENT
Start: 2020-09-04 | End: 2021-05-17

## 2020-10-16 ENCOUNTER — ANNUAL EXAM (OUTPATIENT)
Dept: OBGYN CLINIC | Facility: MEDICAL CENTER | Age: 65
End: 2020-10-16
Payer: COMMERCIAL

## 2020-10-16 VITALS
WEIGHT: 158.2 LBS | SYSTOLIC BLOOD PRESSURE: 132 MMHG | TEMPERATURE: 96.4 F | DIASTOLIC BLOOD PRESSURE: 88 MMHG | BODY MASS INDEX: 28.94 KG/M2

## 2020-10-16 DIAGNOSIS — Z01.419 VISIT FOR GYNECOLOGIC EXAMINATION: Primary | ICD-10-CM

## 2020-10-16 DIAGNOSIS — N95.2 VAGINAL ATROPHY: ICD-10-CM

## 2020-10-16 PROCEDURE — 87624 HPV HI-RISK TYP POOLED RSLT: CPT | Performed by: STUDENT IN AN ORGANIZED HEALTH CARE EDUCATION/TRAINING PROGRAM

## 2020-10-16 PROCEDURE — S0612 ANNUAL GYNECOLOGICAL EXAMINA: HCPCS | Performed by: STUDENT IN AN ORGANIZED HEALTH CARE EDUCATION/TRAINING PROGRAM

## 2020-10-16 PROCEDURE — G0145 SCR C/V CYTO,THINLAYER,RESCR: HCPCS | Performed by: STUDENT IN AN ORGANIZED HEALTH CARE EDUCATION/TRAINING PROGRAM

## 2020-10-17 ENCOUNTER — LAB (OUTPATIENT)
Dept: LAB | Facility: HOSPITAL | Age: 65
End: 2020-10-17
Payer: COMMERCIAL

## 2020-10-17 DIAGNOSIS — E55.9 VITAMIN D DEFICIENCY: ICD-10-CM

## 2020-10-17 DIAGNOSIS — E78.5 DYSLIPIDEMIA: ICD-10-CM

## 2020-10-17 DIAGNOSIS — E11.9 CONTROLLED TYPE 2 DIABETES MELLITUS WITHOUT COMPLICATION, WITHOUT LONG-TERM CURRENT USE OF INSULIN (HCC): ICD-10-CM

## 2020-10-17 DIAGNOSIS — E03.9 ACQUIRED HYPOTHYROIDISM: ICD-10-CM

## 2020-10-17 LAB
25(OH)D3 SERPL-MCNC: 23.3 NG/ML (ref 30–100)
ALBUMIN SERPL BCP-MCNC: 3.7 G/DL (ref 3.5–5)
ALP SERPL-CCNC: 87 U/L (ref 46–116)
ALT SERPL W P-5'-P-CCNC: 46 U/L (ref 12–78)
ANION GAP SERPL CALCULATED.3IONS-SCNC: 7 MMOL/L (ref 4–13)
AST SERPL W P-5'-P-CCNC: 29 U/L (ref 5–45)
BASOPHILS # BLD AUTO: 0.02 THOUSANDS/ΜL (ref 0–0.1)
BASOPHILS NFR BLD AUTO: 0 % (ref 0–1)
BILIRUB SERPL-MCNC: 0.36 MG/DL (ref 0.2–1)
BUN SERPL-MCNC: 16 MG/DL (ref 5–25)
CALCIUM SERPL-MCNC: 9.2 MG/DL (ref 8.3–10.1)
CHLORIDE SERPL-SCNC: 103 MMOL/L (ref 100–108)
CHOLEST SERPL-MCNC: 206 MG/DL (ref 50–200)
CO2 SERPL-SCNC: 28 MMOL/L (ref 21–32)
CREAT SERPL-MCNC: 1.02 MG/DL (ref 0.6–1.3)
EOSINOPHIL # BLD AUTO: 0.09 THOUSAND/ΜL (ref 0–0.61)
EOSINOPHIL NFR BLD AUTO: 1 % (ref 0–6)
ERYTHROCYTE [DISTWIDTH] IN BLOOD BY AUTOMATED COUNT: 14.3 % (ref 11.6–15.1)
EST. AVERAGE GLUCOSE BLD GHB EST-MCNC: 126 MG/DL
GFR SERPL CREATININE-BSD FRML MDRD: 59 ML/MIN/1.73SQ M
GLUCOSE P FAST SERPL-MCNC: 102 MG/DL (ref 65–99)
HBA1C MFR BLD: 6 %
HCT VFR BLD AUTO: 43.7 % (ref 34.8–46.1)
HDLC SERPL-MCNC: 48 MG/DL
HGB BLD-MCNC: 13.8 G/DL (ref 11.5–15.4)
IMM GRANULOCYTES # BLD AUTO: 0.03 THOUSAND/UL (ref 0–0.2)
IMM GRANULOCYTES NFR BLD AUTO: 1 % (ref 0–2)
LDLC SERPL CALC-MCNC: 135 MG/DL (ref 0–100)
LYMPHOCYTES # BLD AUTO: 2.67 THOUSANDS/ΜL (ref 0.6–4.47)
LYMPHOCYTES NFR BLD AUTO: 42 % (ref 14–44)
MCH RBC QN AUTO: 26.9 PG (ref 26.8–34.3)
MCHC RBC AUTO-ENTMCNC: 31.6 G/DL (ref 31.4–37.4)
MCV RBC AUTO: 85 FL (ref 82–98)
MONOCYTES # BLD AUTO: 0.75 THOUSAND/ΜL (ref 0.17–1.22)
MONOCYTES NFR BLD AUTO: 12 % (ref 4–12)
NEUTROPHILS # BLD AUTO: 2.76 THOUSANDS/ΜL (ref 1.85–7.62)
NEUTS SEG NFR BLD AUTO: 44 % (ref 43–75)
NONHDLC SERPL-MCNC: 158 MG/DL
NRBC BLD AUTO-RTO: 0 /100 WBCS
PLATELET # BLD AUTO: 272 THOUSANDS/UL (ref 149–390)
PMV BLD AUTO: 10.9 FL (ref 8.9–12.7)
POTASSIUM SERPL-SCNC: 4.2 MMOL/L (ref 3.5–5.3)
PROT SERPL-MCNC: 7.6 G/DL (ref 6.4–8.2)
RBC # BLD AUTO: 5.13 MILLION/UL (ref 3.81–5.12)
SODIUM SERPL-SCNC: 138 MMOL/L (ref 136–145)
T4 SERPL-MCNC: 8.6 UG/DL (ref 4.7–13.3)
TRIGL SERPL-MCNC: 114 MG/DL
TSH SERPL DL<=0.05 MIU/L-ACNC: 0.25 UIU/ML (ref 0.36–3.74)
WBC # BLD AUTO: 6.32 THOUSAND/UL (ref 4.31–10.16)

## 2020-10-17 PROCEDURE — 84436 ASSAY OF TOTAL THYROXINE: CPT

## 2020-10-17 PROCEDURE — 82306 VITAMIN D 25 HYDROXY: CPT

## 2020-10-17 PROCEDURE — 3044F HG A1C LEVEL LT 7.0%: CPT | Performed by: FAMILY MEDICINE

## 2020-10-17 PROCEDURE — 80053 COMPREHEN METABOLIC PANEL: CPT

## 2020-10-17 PROCEDURE — 80061 LIPID PANEL: CPT

## 2020-10-17 PROCEDURE — 85025 COMPLETE CBC W/AUTO DIFF WBC: CPT

## 2020-10-17 PROCEDURE — 84443 ASSAY THYROID STIM HORMONE: CPT

## 2020-10-17 PROCEDURE — 83036 HEMOGLOBIN GLYCOSYLATED A1C: CPT

## 2020-10-17 PROCEDURE — 36415 COLL VENOUS BLD VENIPUNCTURE: CPT

## 2020-10-20 ENCOUNTER — OFFICE VISIT (OUTPATIENT)
Dept: FAMILY MEDICINE CLINIC | Facility: CLINIC | Age: 65
End: 2020-10-20
Payer: COMMERCIAL

## 2020-10-20 VITALS
BODY MASS INDEX: 29.04 KG/M2 | SYSTOLIC BLOOD PRESSURE: 140 MMHG | HEIGHT: 62 IN | WEIGHT: 157.8 LBS | TEMPERATURE: 97.7 F | DIASTOLIC BLOOD PRESSURE: 82 MMHG

## 2020-10-20 DIAGNOSIS — E03.9 ACQUIRED HYPOTHYROIDISM: ICD-10-CM

## 2020-10-20 DIAGNOSIS — F41.1 GENERALIZED ANXIETY DISORDER: ICD-10-CM

## 2020-10-20 DIAGNOSIS — Z23 NEED FOR VACCINATION: ICD-10-CM

## 2020-10-20 DIAGNOSIS — E11.9 CONTROLLED TYPE 2 DIABETES MELLITUS WITHOUT COMPLICATION, WITHOUT LONG-TERM CURRENT USE OF INSULIN (HCC): Primary | ICD-10-CM

## 2020-10-20 DIAGNOSIS — E55.9 VITAMIN D DEFICIENCY: ICD-10-CM

## 2020-10-20 DIAGNOSIS — E78.5 DYSLIPIDEMIA: ICD-10-CM

## 2020-10-20 DIAGNOSIS — R07.89 ATYPICAL CHEST PAIN: ICD-10-CM

## 2020-10-20 PROCEDURE — 99214 OFFICE O/P EST MOD 30 MIN: CPT | Performed by: FAMILY MEDICINE

## 2020-10-20 PROCEDURE — 90471 IMMUNIZATION ADMIN: CPT | Performed by: FAMILY MEDICINE

## 2020-10-20 PROCEDURE — 90670 PCV13 VACCINE IM: CPT | Performed by: FAMILY MEDICINE

## 2020-10-20 PROCEDURE — 3725F SCREEN DEPRESSION PERFORMED: CPT | Performed by: FAMILY MEDICINE

## 2020-10-20 PROCEDURE — 1036F TOBACCO NON-USER: CPT | Performed by: FAMILY MEDICINE

## 2020-10-20 PROCEDURE — 93000 ELECTROCARDIOGRAM COMPLETE: CPT | Performed by: FAMILY MEDICINE

## 2020-10-20 RX ORDER — BUSPIRONE HYDROCHLORIDE 5 MG/1
TABLET ORAL
Qty: 30 TABLET | Refills: 5 | Status: SHIPPED | OUTPATIENT
Start: 2020-10-20 | End: 2021-11-08

## 2020-10-23 LAB
HPV HR 12 DNA CVX QL NAA+PROBE: NEGATIVE
HPV16 DNA CVX QL NAA+PROBE: NEGATIVE
HPV18 DNA CVX QL NAA+PROBE: NEGATIVE

## 2020-10-26 LAB
LAB AP GYN PRIMARY INTERPRETATION: NORMAL
Lab: NORMAL
PATH INTERP SPEC-IMP: NORMAL

## 2021-02-01 ENCOUNTER — VBI (OUTPATIENT)
Dept: ADMINISTRATIVE | Facility: OTHER | Age: 66
End: 2021-02-01

## 2021-03-02 DIAGNOSIS — F41.1 GENERALIZED ANXIETY DISORDER: ICD-10-CM

## 2021-03-02 RX ORDER — DULOXETIN HYDROCHLORIDE 60 MG/1
60 CAPSULE, DELAYED RELEASE ORAL DAILY
Qty: 90 CAPSULE | Refills: 1 | Status: SHIPPED | OUTPATIENT
Start: 2021-03-02 | End: 2021-03-05

## 2021-03-05 DIAGNOSIS — F41.1 GENERALIZED ANXIETY DISORDER: ICD-10-CM

## 2021-03-05 RX ORDER — DULOXETIN HYDROCHLORIDE 60 MG/1
CAPSULE, DELAYED RELEASE ORAL
Qty: 90 CAPSULE | Refills: 1 | Status: SHIPPED | OUTPATIENT
Start: 2021-03-05 | End: 2021-05-25 | Stop reason: SDUPTHER

## 2021-05-17 DIAGNOSIS — E03.9 ACQUIRED HYPOTHYROIDISM: ICD-10-CM

## 2021-05-17 RX ORDER — LEVOTHYROXINE SODIUM 0.12 MG/1
TABLET ORAL
Qty: 90 TABLET | Refills: 1 | Status: SHIPPED | OUTPATIENT
Start: 2021-05-17 | End: 2021-05-25 | Stop reason: SDUPTHER

## 2021-05-22 ENCOUNTER — APPOINTMENT (OUTPATIENT)
Dept: LAB | Facility: HOSPITAL | Age: 66
End: 2021-05-22
Payer: COMMERCIAL

## 2021-05-22 DIAGNOSIS — E78.5 DYSLIPIDEMIA: ICD-10-CM

## 2021-05-22 DIAGNOSIS — F41.1 GENERALIZED ANXIETY DISORDER: ICD-10-CM

## 2021-05-22 DIAGNOSIS — E11.9 CONTROLLED TYPE 2 DIABETES MELLITUS WITHOUT COMPLICATION, WITHOUT LONG-TERM CURRENT USE OF INSULIN (HCC): ICD-10-CM

## 2021-05-22 DIAGNOSIS — E03.9 ACQUIRED HYPOTHYROIDISM: ICD-10-CM

## 2021-05-22 DIAGNOSIS — E55.9 VITAMIN D DEFICIENCY: ICD-10-CM

## 2021-05-22 LAB
25(OH)D3 SERPL-MCNC: 30.4 NG/ML (ref 30–100)
ALBUMIN SERPL BCP-MCNC: 3.6 G/DL (ref 3.5–5)
ALP SERPL-CCNC: 93 U/L (ref 46–116)
ALT SERPL W P-5'-P-CCNC: 44 U/L (ref 12–78)
ANION GAP SERPL CALCULATED.3IONS-SCNC: 5 MMOL/L (ref 4–13)
AST SERPL W P-5'-P-CCNC: 29 U/L (ref 5–45)
BASOPHILS # BLD AUTO: 0.04 THOUSANDS/ΜL (ref 0–0.1)
BASOPHILS NFR BLD AUTO: 1 % (ref 0–1)
BILIRUB SERPL-MCNC: 0.27 MG/DL (ref 0.2–1)
BUN SERPL-MCNC: 21 MG/DL (ref 5–25)
CALCIUM SERPL-MCNC: 9.3 MG/DL (ref 8.3–10.1)
CHLORIDE SERPL-SCNC: 107 MMOL/L (ref 100–108)
CHOLEST SERPL-MCNC: 184 MG/DL (ref 50–200)
CO2 SERPL-SCNC: 30 MMOL/L (ref 21–32)
CREAT SERPL-MCNC: 0.97 MG/DL (ref 0.6–1.3)
EOSINOPHIL # BLD AUTO: 0.12 THOUSAND/ΜL (ref 0–0.61)
EOSINOPHIL NFR BLD AUTO: 2 % (ref 0–6)
ERYTHROCYTE [DISTWIDTH] IN BLOOD BY AUTOMATED COUNT: 15.1 % (ref 11.6–15.1)
EST. AVERAGE GLUCOSE BLD GHB EST-MCNC: 128 MG/DL
GFR SERPL CREATININE-BSD FRML MDRD: 62 ML/MIN/1.73SQ M
GLUCOSE P FAST SERPL-MCNC: 99 MG/DL (ref 65–99)
HBA1C MFR BLD: 6.1 %
HCT VFR BLD AUTO: 42.5 % (ref 34.8–46.1)
HDLC SERPL-MCNC: 48 MG/DL
HGB BLD-MCNC: 13.6 G/DL (ref 11.5–15.4)
IMM GRANULOCYTES # BLD AUTO: 0.02 THOUSAND/UL (ref 0–0.2)
IMM GRANULOCYTES NFR BLD AUTO: 0 % (ref 0–2)
LDLC SERPL CALC-MCNC: 116 MG/DL (ref 0–100)
LYMPHOCYTES # BLD AUTO: 2.52 THOUSANDS/ΜL (ref 0.6–4.47)
LYMPHOCYTES NFR BLD AUTO: 39 % (ref 14–44)
MCH RBC QN AUTO: 27.5 PG (ref 26.8–34.3)
MCHC RBC AUTO-ENTMCNC: 32 G/DL (ref 31.4–37.4)
MCV RBC AUTO: 86 FL (ref 82–98)
MONOCYTES # BLD AUTO: 0.78 THOUSAND/ΜL (ref 0.17–1.22)
MONOCYTES NFR BLD AUTO: 12 % (ref 4–12)
NEUTROPHILS # BLD AUTO: 2.95 THOUSANDS/ΜL (ref 1.85–7.62)
NEUTS SEG NFR BLD AUTO: 46 % (ref 43–75)
NONHDLC SERPL-MCNC: 136 MG/DL
NRBC BLD AUTO-RTO: 0 /100 WBCS
PLATELET # BLD AUTO: 275 THOUSANDS/UL (ref 149–390)
PMV BLD AUTO: 11 FL (ref 8.9–12.7)
POTASSIUM SERPL-SCNC: 4.6 MMOL/L (ref 3.5–5.3)
PROT SERPL-MCNC: 7.2 G/DL (ref 6.4–8.2)
RBC # BLD AUTO: 4.94 MILLION/UL (ref 3.81–5.12)
SODIUM SERPL-SCNC: 142 MMOL/L (ref 136–145)
T4 SERPL-MCNC: 8.7 UG/DL (ref 4.7–13.3)
TRIGL SERPL-MCNC: 101 MG/DL
TSH SERPL DL<=0.05 MIU/L-ACNC: 0.03 UIU/ML (ref 0.36–3.74)
WBC # BLD AUTO: 6.43 THOUSAND/UL (ref 4.31–10.16)

## 2021-05-22 PROCEDURE — 82306 VITAMIN D 25 HYDROXY: CPT

## 2021-05-22 PROCEDURE — 80061 LIPID PANEL: CPT

## 2021-05-22 PROCEDURE — 3044F HG A1C LEVEL LT 7.0%: CPT | Performed by: FAMILY MEDICINE

## 2021-05-22 PROCEDURE — 36415 COLL VENOUS BLD VENIPUNCTURE: CPT

## 2021-05-22 PROCEDURE — 83036 HEMOGLOBIN GLYCOSYLATED A1C: CPT

## 2021-05-22 PROCEDURE — 85025 COMPLETE CBC W/AUTO DIFF WBC: CPT

## 2021-05-22 PROCEDURE — 84436 ASSAY OF TOTAL THYROXINE: CPT

## 2021-05-22 PROCEDURE — 84443 ASSAY THYROID STIM HORMONE: CPT

## 2021-05-22 PROCEDURE — 80053 COMPREHEN METABOLIC PANEL: CPT

## 2021-05-25 ENCOUNTER — OFFICE VISIT (OUTPATIENT)
Dept: FAMILY MEDICINE CLINIC | Facility: CLINIC | Age: 66
End: 2021-05-25
Payer: COMMERCIAL

## 2021-05-25 VITALS
BODY MASS INDEX: 29 KG/M2 | WEIGHT: 157.6 LBS | SYSTOLIC BLOOD PRESSURE: 130 MMHG | TEMPERATURE: 97.9 F | HEIGHT: 62 IN | DIASTOLIC BLOOD PRESSURE: 78 MMHG

## 2021-05-25 DIAGNOSIS — E11.9 CONTROLLED TYPE 2 DIABETES MELLITUS WITHOUT COMPLICATION, WITHOUT LONG-TERM CURRENT USE OF INSULIN (HCC): Primary | ICD-10-CM

## 2021-05-25 DIAGNOSIS — E03.9 ACQUIRED HYPOTHYROIDISM: ICD-10-CM

## 2021-05-25 DIAGNOSIS — E78.5 DYSLIPIDEMIA: ICD-10-CM

## 2021-05-25 DIAGNOSIS — M79.7 FIBROMYALGIA: ICD-10-CM

## 2021-05-25 DIAGNOSIS — Z12.11 SCREENING FOR COLON CANCER: ICD-10-CM

## 2021-05-25 DIAGNOSIS — F41.1 GENERALIZED ANXIETY DISORDER: ICD-10-CM

## 2021-05-25 DIAGNOSIS — E55.9 VITAMIN D DEFICIENCY: ICD-10-CM

## 2021-05-25 PROBLEM — R07.89 ATYPICAL CHEST PAIN: Status: RESOLVED | Noted: 2020-10-20 | Resolved: 2021-05-25

## 2021-05-25 PROCEDURE — 3008F BODY MASS INDEX DOCD: CPT | Performed by: FAMILY MEDICINE

## 2021-05-25 PROCEDURE — 1036F TOBACCO NON-USER: CPT | Performed by: FAMILY MEDICINE

## 2021-05-25 PROCEDURE — 99214 OFFICE O/P EST MOD 30 MIN: CPT | Performed by: FAMILY MEDICINE

## 2021-05-25 RX ORDER — DULOXETIN HYDROCHLORIDE 60 MG/1
60 CAPSULE, DELAYED RELEASE ORAL DAILY
Qty: 90 CAPSULE | Refills: 1 | Status: SHIPPED | OUTPATIENT
Start: 2021-05-25 | End: 2021-11-30

## 2021-05-25 RX ORDER — LEVOTHYROXINE SODIUM 0.1 MG/1
100 TABLET ORAL DAILY
Qty: 90 TABLET | Refills: 1 | Status: SHIPPED | OUTPATIENT
Start: 2021-05-25 | End: 2021-09-01 | Stop reason: SDUPTHER

## 2021-05-25 NOTE — PROGRESS NOTES
Assessment/Plan:    Controlled type 2 diabetes mellitus without complication, without long-term current use of insulin (HCC)    Lab Results   Component Value Date    HGBA1C 6 1 (H) 05/22/2021     Diet controlled, continue discretion, physical activity  Recheck 6 months    Dyslipidemia   Profile is satisfactory with dietary modifications  Continue same  Recheck in 6 months    Acquired hypothyroidism   TSH is a little low, will decrease levothyroxine to 100 mcg daily  Recheck lab in 3 months  Generalized anxiety disorder    Continue duloxetine 60 mg daily  With BuSpar as needed  Recheck in the office in 6 months  Vitamin D deficiency    Levels are now therapeutic taking 2000 units of vitamin-D per day  Continue calcium 59 mg daily as well  Diagnoses and all orders for this visit:    Controlled type 2 diabetes mellitus without complication, without long-term current use of insulin (HCC)    Dyslipidemia    Generalized anxiety disorder  -     DULoxetine (CYMBALTA) 60 mg delayed release capsule; Take 1 capsule (60 mg total) by mouth daily    Acquired hypothyroidism  -     TSH, 3rd generation; Future  -     T4; Future  -     levothyroxine 100 mcg tablet; Take 1 tablet (100 mcg total) by mouth daily    Fibromyalgia    Vitamin D deficiency    Screening for colon cancer  -     Ambulatory referral to Colorectal Surgery; Future          Subjective:   Chief Complaint   Patient presents with    Diabetes    Hypothyroidism    Anxiety     refill cymbalta 90 day           Patient ID: Ingrid Peres is a 60 y o  female  She is here for follow-up on her diet-controlled diabetes, diet-controlled hyperlipidemia, acquired hypothyroidism, anxiety, weight, vitamin-D deficiency, and a general checkup  Overall she feels fairly well  She has had a gyn exam recently, and a mammogram   She is due for colonoscopy and would like a referral   She is up-to-date with eye exams        The following portions of the patient's history were reviewed and updated as appropriate: allergies, current medications, past family history, past medical history, past social history, past surgical history and problem list     Review of Systems   Constitutional: Negative for activity change, chills, diaphoresis, fatigue, fever and unexpected weight change  HENT: Negative for congestion, ear pain, hearing loss, nosebleeds, postnasal drip, rhinorrhea, sinus pressure, sore throat and tinnitus  Eyes: Negative for photophobia, pain, discharge, redness and visual disturbance  Respiratory: Negative for cough, chest tightness, shortness of breath and wheezing  Cardiovascular: Negative for chest pain, palpitations and leg swelling  Denies NEWSOME   Gastrointestinal: Negative for abdominal pain, blood in stool, constipation, diarrhea, nausea and vomiting  Endocrine: Negative  Genitourinary: Negative for difficulty urinating, dysuria, frequency, hematuria and urgency  Musculoskeletal: Negative for arthralgias, joint swelling and myalgias  Skin: Negative for rash and wound  Denies skin lesions, change in birthmarks   Allergic/Immunologic: Negative for environmental allergies, food allergies and immunocompromised state  Neurological: Negative for dizziness, tremors, seizures, syncope, weakness, numbness and headaches  Hematological: Negative  Psychiatric/Behavioral: Negative for behavioral problems, confusion, decreased concentration and sleep disturbance  The patient is not nervous/anxious  Objective:      /78   Temp 97 9 °F (36 6 °C)   Ht 5' 2" (1 575 m)   Wt 71 5 kg (157 lb 9 6 oz)   BMI 28 83 kg/m²          Physical Exam  Constitutional:       General: She is not in acute distress  Appearance: She is well-developed  HENT:      Head: Normocephalic and atraumatic        Right Ear: Tympanic membrane, ear canal and external ear normal       Left Ear: Tympanic membrane, ear canal and external ear normal       Nose: Nose normal    Eyes:      Conjunctiva/sclera: Conjunctivae normal       Pupils: Pupils are equal, round, and reactive to light  Neck:      Musculoskeletal: Normal range of motion and neck supple  Thyroid: No thyromegaly  Vascular: No JVD  Trachea: No tracheal deviation  Cardiovascular:      Rate and Rhythm: Normal rate and regular rhythm  Pulses: no weak pulses          Dorsalis pedis pulses are 2+ on the right side and 2+ on the left side  Posterior tibial pulses are 2+ on the right side and 2+ on the left side  Heart sounds: Normal heart sounds  No murmur  Pulmonary:      Effort: Pulmonary effort is normal       Breath sounds: Normal breath sounds  No wheezing or rales  Abdominal:      General: Bowel sounds are normal       Palpations: Abdomen is soft  There is no mass  Tenderness: There is no abdominal tenderness  There is no guarding or rebound  Musculoskeletal: Normal range of motion  General: No tenderness  Feet:      Right foot:      Skin integrity: No ulcer, skin breakdown, erythema, warmth, callus or dry skin  Left foot:      Skin integrity: No ulcer, skin breakdown, erythema, warmth, callus or dry skin  Lymphadenopathy:      Cervical: No cervical adenopathy  Skin:     General: Skin is warm and dry  Findings: No lesion or rash  Neurological:      Mental Status: She is alert and oriented to person, place, and time  Cranial Nerves: No cranial nerve deficit  Deep Tendon Reflexes: Reflexes are normal and symmetric  Psychiatric:         Judgment: Judgment normal        BMI Counseling: Body mass index is 28 83 kg/m²  The BMI is above normal  Nutrition recommendations include moderation in carbohydrate intake, increasing intake of lean protein and reducing intake of saturated and trans fat  Exercise recommendations include exercising 3-5 times per week  Patient's shoes and socks removed  Right Foot/Ankle Right Foot Inspection  Skin Exam: skin normal and skin intact no dry skin, no warmth, no callus, no erythema, no maceration, no abnormal color, no pre-ulcer, no ulcer and no callus                          Toe Exam: ROM and strength within normal limits  Sensory   Vibration: intact  Proprioception: intact   Monofilament testing: intact  Vascular  Capillary refills: < 3 seconds  The right DP pulse is 2+  The right PT pulse is 2+  Left Foot/Ankle  Left Foot Inspection  Skin Exam: skin normal and skin intactno dry skin, no warmth, no erythema, no maceration, normal color, no pre-ulcer, no ulcer and no callus                         Toe Exam: ROM and strength within normal limits                   Sensory   Vibration: intact  Proprioception: intact  Monofilament: intact  Vascular  Capillary refills: < 3 seconds  The left DP pulse is 2+  The left PT pulse is 2+  Assign Risk Category:  No deformity present; No loss of protective sensation;  No weak pulses       Risk: 0

## 2021-05-25 NOTE — ASSESSMENT & PLAN NOTE
Levels are now therapeutic taking 2000 units of vitamin-D per day  Continue calcium 59 mg daily as well

## 2021-05-25 NOTE — ASSESSMENT & PLAN NOTE
Lab Results   Component Value Date    HGBA1C 6 1 (H) 05/22/2021     Diet controlled, continue discretion, physical activity    Recheck 6 months

## 2021-09-01 DIAGNOSIS — E03.9 ACQUIRED HYPOTHYROIDISM: ICD-10-CM

## 2021-09-01 RX ORDER — LEVOTHYROXINE SODIUM 0.1 MG/1
100 TABLET ORAL DAILY
Qty: 90 TABLET | Refills: 1 | Status: SHIPPED | OUTPATIENT
Start: 2021-09-01 | End: 2022-03-03

## 2021-10-19 ENCOUNTER — ANNUAL EXAM (OUTPATIENT)
Dept: OBGYN CLINIC | Facility: MEDICAL CENTER | Age: 66
End: 2021-10-19
Payer: COMMERCIAL

## 2021-10-19 VITALS
SYSTOLIC BLOOD PRESSURE: 130 MMHG | HEIGHT: 62 IN | WEIGHT: 160 LBS | DIASTOLIC BLOOD PRESSURE: 80 MMHG | BODY MASS INDEX: 29.44 KG/M2

## 2021-10-19 DIAGNOSIS — B96.89 BACTERIAL VAGINOSIS: ICD-10-CM

## 2021-10-19 DIAGNOSIS — N95.8 GENITOURINARY SYNDROME OF MENOPAUSE: ICD-10-CM

## 2021-10-19 DIAGNOSIS — Z01.419 ENCOUNTER FOR GYNECOLOGICAL EXAMINATION: Primary | ICD-10-CM

## 2021-10-19 DIAGNOSIS — Z12.31 ENCOUNTER FOR SCREENING MAMMOGRAM FOR MALIGNANT NEOPLASM OF BREAST: ICD-10-CM

## 2021-10-19 DIAGNOSIS — Z13.820 ENCOUNTER FOR SCREENING FOR OSTEOPOROSIS: ICD-10-CM

## 2021-10-19 DIAGNOSIS — N76.0 BACTERIAL VAGINOSIS: ICD-10-CM

## 2021-10-19 PROCEDURE — 3008F BODY MASS INDEX DOCD: CPT | Performed by: STUDENT IN AN ORGANIZED HEALTH CARE EDUCATION/TRAINING PROGRAM

## 2021-10-19 PROCEDURE — 1036F TOBACCO NON-USER: CPT | Performed by: STUDENT IN AN ORGANIZED HEALTH CARE EDUCATION/TRAINING PROGRAM

## 2021-10-19 PROCEDURE — 99396 PREV VISIT EST AGE 40-64: CPT | Performed by: STUDENT IN AN ORGANIZED HEALTH CARE EDUCATION/TRAINING PROGRAM

## 2021-10-19 RX ORDER — METRONIDAZOLE 500 MG/1
500 TABLET ORAL EVERY 12 HOURS SCHEDULED
Qty: 14 TABLET | Refills: 0 | Status: SHIPPED | OUTPATIENT
Start: 2021-10-19 | End: 2021-10-26

## 2021-10-19 RX ORDER — ESTRADIOL 0.1 MG/G
1 CREAM VAGINAL 2 TIMES WEEKLY
Qty: 42.5 G | Refills: 1 | Status: SHIPPED | OUTPATIENT
Start: 2021-10-21 | End: 2022-05-31

## 2021-10-26 ENCOUNTER — TELEPHONE (OUTPATIENT)
Dept: OBGYN CLINIC | Facility: MEDICAL CENTER | Age: 66
End: 2021-10-26

## 2021-11-08 ENCOUNTER — TELEPHONE (OUTPATIENT)
Dept: FAMILY MEDICINE CLINIC | Facility: CLINIC | Age: 66
End: 2021-11-08

## 2021-11-26 ENCOUNTER — TELEPHONE (OUTPATIENT)
Dept: ADMINISTRATIVE | Facility: OTHER | Age: 66
End: 2021-11-26

## 2021-11-27 ENCOUNTER — APPOINTMENT (OUTPATIENT)
Dept: LAB | Facility: HOSPITAL | Age: 66
End: 2021-11-27
Payer: COMMERCIAL

## 2021-11-27 DIAGNOSIS — E11.9 CONTROLLED TYPE 2 DIABETES MELLITUS WITHOUT COMPLICATION, WITHOUT LONG-TERM CURRENT USE OF INSULIN (HCC): ICD-10-CM

## 2021-11-27 DIAGNOSIS — E55.9 VITAMIN D DEFICIENCY: ICD-10-CM

## 2021-11-27 DIAGNOSIS — E03.9 ACQUIRED HYPOTHYROIDISM: ICD-10-CM

## 2021-11-27 DIAGNOSIS — E78.5 DYSLIPIDEMIA: ICD-10-CM

## 2021-11-27 LAB
25(OH)D3 SERPL-MCNC: 29.6 NG/ML (ref 30–100)
ALBUMIN SERPL BCP-MCNC: 3.9 G/DL (ref 3.5–5)
ALP SERPL-CCNC: 91 U/L (ref 46–116)
ALT SERPL W P-5'-P-CCNC: 60 U/L (ref 12–78)
ANION GAP SERPL CALCULATED.3IONS-SCNC: 10 MMOL/L (ref 4–13)
AST SERPL W P-5'-P-CCNC: 30 U/L (ref 5–45)
BASOPHILS # BLD AUTO: 0.03 THOUSANDS/ΜL (ref 0–0.1)
BASOPHILS NFR BLD AUTO: 0 % (ref 0–1)
BILIRUB SERPL-MCNC: 0.41 MG/DL (ref 0.2–1)
BUN SERPL-MCNC: 21 MG/DL (ref 5–25)
CALCIUM SERPL-MCNC: 9.2 MG/DL (ref 8.3–10.1)
CHLORIDE SERPL-SCNC: 103 MMOL/L (ref 100–108)
CHOLEST SERPL-MCNC: 235 MG/DL
CO2 SERPL-SCNC: 27 MMOL/L (ref 21–32)
CREAT SERPL-MCNC: 1.1 MG/DL (ref 0.6–1.3)
EOSINOPHIL # BLD AUTO: 0.11 THOUSAND/ΜL (ref 0–0.61)
EOSINOPHIL NFR BLD AUTO: 2 % (ref 0–6)
ERYTHROCYTE [DISTWIDTH] IN BLOOD BY AUTOMATED COUNT: 14.8 % (ref 11.6–15.1)
EST. AVERAGE GLUCOSE BLD GHB EST-MCNC: 134 MG/DL
GFR SERPL CREATININE-BSD FRML MDRD: 53 ML/MIN/1.73SQ M
GLUCOSE P FAST SERPL-MCNC: 111 MG/DL (ref 65–99)
HBA1C MFR BLD: 6.3 %
HCT VFR BLD AUTO: 44 % (ref 34.8–46.1)
HDLC SERPL-MCNC: 52 MG/DL
HGB BLD-MCNC: 14.1 G/DL (ref 11.5–15.4)
IMM GRANULOCYTES # BLD AUTO: 0.02 THOUSAND/UL (ref 0–0.2)
IMM GRANULOCYTES NFR BLD AUTO: 0 % (ref 0–2)
LDLC SERPL CALC-MCNC: 158 MG/DL (ref 0–100)
LYMPHOCYTES # BLD AUTO: 2.88 THOUSANDS/ΜL (ref 0.6–4.47)
LYMPHOCYTES NFR BLD AUTO: 40 % (ref 14–44)
MCH RBC QN AUTO: 27.6 PG (ref 26.8–34.3)
MCHC RBC AUTO-ENTMCNC: 32 G/DL (ref 31.4–37.4)
MCV RBC AUTO: 86 FL (ref 82–98)
MONOCYTES # BLD AUTO: 0.7 THOUSAND/ΜL (ref 0.17–1.22)
MONOCYTES NFR BLD AUTO: 10 % (ref 4–12)
NEUTROPHILS # BLD AUTO: 3.53 THOUSANDS/ΜL (ref 1.85–7.62)
NEUTS SEG NFR BLD AUTO: 48 % (ref 43–75)
NONHDLC SERPL-MCNC: 183 MG/DL
NRBC BLD AUTO-RTO: 0 /100 WBCS
PLATELET # BLD AUTO: 290 THOUSANDS/UL (ref 149–390)
PMV BLD AUTO: 10.8 FL (ref 8.9–12.7)
POTASSIUM SERPL-SCNC: 4.3 MMOL/L (ref 3.5–5.3)
PROT SERPL-MCNC: 7.6 G/DL (ref 6.4–8.2)
RBC # BLD AUTO: 5.11 MILLION/UL (ref 3.81–5.12)
SODIUM SERPL-SCNC: 140 MMOL/L (ref 136–145)
T4 SERPL-MCNC: 8.5 UG/DL (ref 4.7–13.3)
TRIGL SERPL-MCNC: 127 MG/DL
TSH SERPL DL<=0.05 MIU/L-ACNC: 0.67 UIU/ML (ref 0.36–3.74)
WBC # BLD AUTO: 7.27 THOUSAND/UL (ref 4.31–10.16)

## 2021-11-27 PROCEDURE — 80061 LIPID PANEL: CPT

## 2021-11-27 PROCEDURE — 85025 COMPLETE CBC W/AUTO DIFF WBC: CPT

## 2021-11-27 PROCEDURE — 3061F NEG MICROALBUMINURIA REV: CPT | Performed by: FAMILY MEDICINE

## 2021-11-27 PROCEDURE — 84436 ASSAY OF TOTAL THYROXINE: CPT

## 2021-11-27 PROCEDURE — 36415 COLL VENOUS BLD VENIPUNCTURE: CPT

## 2021-11-27 PROCEDURE — 83036 HEMOGLOBIN GLYCOSYLATED A1C: CPT

## 2021-11-27 PROCEDURE — 84443 ASSAY THYROID STIM HORMONE: CPT

## 2021-11-27 PROCEDURE — 80053 COMPREHEN METABOLIC PANEL: CPT

## 2021-11-27 PROCEDURE — 3044F HG A1C LEVEL LT 7.0%: CPT | Performed by: FAMILY MEDICINE

## 2021-11-27 PROCEDURE — 82306 VITAMIN D 25 HYDROXY: CPT

## 2021-11-30 ENCOUNTER — OFFICE VISIT (OUTPATIENT)
Dept: FAMILY MEDICINE CLINIC | Facility: CLINIC | Age: 66
End: 2021-11-30
Payer: COMMERCIAL

## 2021-11-30 VITALS
DIASTOLIC BLOOD PRESSURE: 78 MMHG | BODY MASS INDEX: 28.89 KG/M2 | HEIGHT: 62 IN | TEMPERATURE: 97.6 F | WEIGHT: 157 LBS | SYSTOLIC BLOOD PRESSURE: 130 MMHG

## 2021-11-30 DIAGNOSIS — E03.9 ACQUIRED HYPOTHYROIDISM: ICD-10-CM

## 2021-11-30 DIAGNOSIS — E78.5 DYSLIPIDEMIA: ICD-10-CM

## 2021-11-30 DIAGNOSIS — F41.1 GENERALIZED ANXIETY DISORDER: ICD-10-CM

## 2021-11-30 DIAGNOSIS — E55.9 VITAMIN D DEFICIENCY: ICD-10-CM

## 2021-11-30 DIAGNOSIS — E11.9 CONTROLLED TYPE 2 DIABETES MELLITUS WITHOUT COMPLICATION, WITHOUT LONG-TERM CURRENT USE OF INSULIN (HCC): Primary | ICD-10-CM

## 2021-11-30 DIAGNOSIS — M13.0 POLYARTHROPATHY: ICD-10-CM

## 2021-11-30 PROCEDURE — 3008F BODY MASS INDEX DOCD: CPT | Performed by: FAMILY MEDICINE

## 2021-11-30 PROCEDURE — 99214 OFFICE O/P EST MOD 30 MIN: CPT | Performed by: FAMILY MEDICINE

## 2021-11-30 PROCEDURE — 1036F TOBACCO NON-USER: CPT | Performed by: FAMILY MEDICINE

## 2021-11-30 PROCEDURE — 3725F SCREEN DEPRESSION PERFORMED: CPT | Performed by: FAMILY MEDICINE

## 2021-11-30 RX ORDER — DULOXETIN HYDROCHLORIDE 60 MG/1
60 CAPSULE, DELAYED RELEASE ORAL DAILY
Qty: 90 CAPSULE | Refills: 1 | Status: SHIPPED | OUTPATIENT
Start: 2021-11-30 | End: 2022-05-31

## 2021-12-22 ENCOUNTER — TELEPHONE (OUTPATIENT)
Dept: FAMILY MEDICINE CLINIC | Facility: CLINIC | Age: 66
End: 2021-12-22

## 2021-12-22 ENCOUNTER — HOSPITAL ENCOUNTER (OUTPATIENT)
Dept: BONE DENSITY | Facility: CLINIC | Age: 66
Discharge: HOME | End: 2021-12-22
Payer: COMMERCIAL

## 2021-12-22 DIAGNOSIS — Z13.820 ENCOUNTER FOR SCREENING FOR OSTEOPOROSIS: ICD-10-CM

## 2021-12-22 DIAGNOSIS — Z13.820 SCREENING FOR OSTEOPOROSIS: ICD-10-CM

## 2021-12-22 PROCEDURE — 77080 DXA BONE DENSITY AXIAL: CPT

## 2022-03-03 DIAGNOSIS — E03.9 ACQUIRED HYPOTHYROIDISM: ICD-10-CM

## 2022-03-03 RX ORDER — LEVOTHYROXINE SODIUM 0.1 MG/1
TABLET ORAL
Qty: 90 TABLET | Refills: 1 | Status: SHIPPED | OUTPATIENT
Start: 2022-03-03

## 2022-05-21 ENCOUNTER — APPOINTMENT (OUTPATIENT)
Dept: LAB | Facility: HOSPITAL | Age: 67
End: 2022-05-21
Payer: COMMERCIAL

## 2022-05-21 DIAGNOSIS — F41.1 GENERALIZED ANXIETY DISORDER: ICD-10-CM

## 2022-05-21 DIAGNOSIS — E11.9 CONTROLLED TYPE 2 DIABETES MELLITUS WITHOUT COMPLICATION, WITHOUT LONG-TERM CURRENT USE OF INSULIN (HCC): ICD-10-CM

## 2022-05-21 DIAGNOSIS — E03.9 ACQUIRED HYPOTHYROIDISM: ICD-10-CM

## 2022-05-21 DIAGNOSIS — E78.5 DYSLIPIDEMIA: ICD-10-CM

## 2022-05-21 DIAGNOSIS — E55.9 VITAMIN D DEFICIENCY: ICD-10-CM

## 2022-05-21 LAB
25(OH)D3 SERPL-MCNC: 38.4 NG/ML (ref 30–100)
ALBUMIN SERPL BCP-MCNC: 3.9 G/DL (ref 3.5–5)
ALP SERPL-CCNC: 96 U/L (ref 46–116)
ALT SERPL W P-5'-P-CCNC: 49 U/L (ref 12–78)
ANION GAP SERPL CALCULATED.3IONS-SCNC: 7 MMOL/L (ref 4–13)
AST SERPL W P-5'-P-CCNC: 33 U/L (ref 5–45)
BASOPHILS # BLD AUTO: 0.03 THOUSANDS/ΜL (ref 0–0.1)
BASOPHILS NFR BLD AUTO: 1 % (ref 0–1)
BILIRUB SERPL-MCNC: 0.36 MG/DL (ref 0.2–1)
BILIRUB UR QL STRIP: NEGATIVE
BUN SERPL-MCNC: 18 MG/DL (ref 5–25)
CALCIUM SERPL-MCNC: 8.9 MG/DL (ref 8.3–10.1)
CHLORIDE SERPL-SCNC: 103 MMOL/L (ref 100–108)
CHOLEST SERPL-MCNC: 216 MG/DL
CLARITY UR: NORMAL
CO2 SERPL-SCNC: 28 MMOL/L (ref 21–32)
COLOR UR: YELLOW
CREAT SERPL-MCNC: 1.05 MG/DL (ref 0.6–1.3)
EOSINOPHIL # BLD AUTO: 0.17 THOUSAND/ΜL (ref 0–0.61)
EOSINOPHIL NFR BLD AUTO: 3 % (ref 0–6)
ERYTHROCYTE [DISTWIDTH] IN BLOOD BY AUTOMATED COUNT: 15.1 % (ref 11.6–15.1)
EST. AVERAGE GLUCOSE BLD GHB EST-MCNC: 134 MG/DL
GFR SERPL CREATININE-BSD FRML MDRD: 55 ML/MIN/1.73SQ M
GLUCOSE P FAST SERPL-MCNC: 103 MG/DL (ref 65–99)
GLUCOSE UR STRIP-MCNC: NEGATIVE MG/DL
HBA1C MFR BLD: 6.3 %
HCT VFR BLD AUTO: 44.9 % (ref 34.8–46.1)
HDLC SERPL-MCNC: 51 MG/DL
HGB BLD-MCNC: 14.4 G/DL (ref 11.5–15.4)
HGB UR QL STRIP.AUTO: NEGATIVE
IMM GRANULOCYTES # BLD AUTO: 0.01 THOUSAND/UL (ref 0–0.2)
IMM GRANULOCYTES NFR BLD AUTO: 0 % (ref 0–2)
KETONES UR STRIP-MCNC: NEGATIVE MG/DL
LDLC SERPL CALC-MCNC: 136 MG/DL (ref 0–100)
LEUKOCYTE ESTERASE UR QL STRIP: NEGATIVE
LYMPHOCYTES # BLD AUTO: 2.75 THOUSANDS/ΜL (ref 0.6–4.47)
LYMPHOCYTES NFR BLD AUTO: 46 % (ref 14–44)
MCH RBC QN AUTO: 27.9 PG (ref 26.8–34.3)
MCHC RBC AUTO-ENTMCNC: 32.1 G/DL (ref 31.4–37.4)
MCV RBC AUTO: 87 FL (ref 82–98)
MONOCYTES # BLD AUTO: 0.55 THOUSAND/ΜL (ref 0.17–1.22)
MONOCYTES NFR BLD AUTO: 9 % (ref 4–12)
NEUTROPHILS # BLD AUTO: 2.43 THOUSANDS/ΜL (ref 1.85–7.62)
NEUTS SEG NFR BLD AUTO: 41 % (ref 43–75)
NITRITE UR QL STRIP: NEGATIVE
NONHDLC SERPL-MCNC: 165 MG/DL
NRBC BLD AUTO-RTO: 0 /100 WBCS
PH UR STRIP.AUTO: 7 [PH]
PLATELET # BLD AUTO: 255 THOUSANDS/UL (ref 149–390)
PMV BLD AUTO: 11.3 FL (ref 8.9–12.7)
POTASSIUM SERPL-SCNC: 4.3 MMOL/L (ref 3.5–5.3)
PROT SERPL-MCNC: 7.7 G/DL (ref 6.4–8.2)
PROT UR STRIP-MCNC: NEGATIVE MG/DL
RBC # BLD AUTO: 5.16 MILLION/UL (ref 3.81–5.12)
SODIUM SERPL-SCNC: 138 MMOL/L (ref 136–145)
SP GR UR STRIP.AUTO: 1.01 (ref 1–1.03)
T4 SERPL-MCNC: 7.8 UG/DL (ref 4.7–13.3)
TRIGL SERPL-MCNC: 143 MG/DL
TSH SERPL DL<=0.05 MIU/L-ACNC: 1.45 UIU/ML (ref 0.45–4.5)
UROBILINOGEN UR QL STRIP.AUTO: 0.2 E.U./DL
WBC # BLD AUTO: 5.94 THOUSAND/UL (ref 4.31–10.16)

## 2022-05-21 PROCEDURE — 85025 COMPLETE CBC W/AUTO DIFF WBC: CPT

## 2022-05-21 PROCEDURE — 36415 COLL VENOUS BLD VENIPUNCTURE: CPT

## 2022-05-21 PROCEDURE — 84436 ASSAY OF TOTAL THYROXINE: CPT

## 2022-05-21 PROCEDURE — 82306 VITAMIN D 25 HYDROXY: CPT

## 2022-05-21 PROCEDURE — 84443 ASSAY THYROID STIM HORMONE: CPT

## 2022-05-21 PROCEDURE — 83036 HEMOGLOBIN GLYCOSYLATED A1C: CPT

## 2022-05-21 PROCEDURE — 3044F HG A1C LEVEL LT 7.0%: CPT | Performed by: FAMILY MEDICINE

## 2022-05-21 PROCEDURE — 80061 LIPID PANEL: CPT

## 2022-05-21 PROCEDURE — 81003 URINALYSIS AUTO W/O SCOPE: CPT

## 2022-05-21 PROCEDURE — 80053 COMPREHEN METABOLIC PANEL: CPT

## 2022-05-23 ENCOUNTER — RA CDI HCC (OUTPATIENT)
Dept: OTHER | Facility: HOSPITAL | Age: 67
End: 2022-05-23

## 2022-05-31 ENCOUNTER — OFFICE VISIT (OUTPATIENT)
Dept: FAMILY MEDICINE CLINIC | Facility: CLINIC | Age: 67
End: 2022-05-31
Payer: COMMERCIAL

## 2022-05-31 VITALS
TEMPERATURE: 97.9 F | DIASTOLIC BLOOD PRESSURE: 80 MMHG | WEIGHT: 156 LBS | BODY MASS INDEX: 28.71 KG/M2 | SYSTOLIC BLOOD PRESSURE: 138 MMHG | HEIGHT: 62 IN

## 2022-05-31 DIAGNOSIS — E03.9 ACQUIRED HYPOTHYROIDISM: ICD-10-CM

## 2022-05-31 DIAGNOSIS — Z11.59 NEED FOR HEPATITIS C SCREENING TEST: ICD-10-CM

## 2022-05-31 DIAGNOSIS — E78.5 DYSLIPIDEMIA: ICD-10-CM

## 2022-05-31 DIAGNOSIS — Z11.4 SCREENING FOR HIV (HUMAN IMMUNODEFICIENCY VIRUS): ICD-10-CM

## 2022-05-31 DIAGNOSIS — E55.9 VITAMIN D DEFICIENCY: ICD-10-CM

## 2022-05-31 DIAGNOSIS — F41.1 GENERALIZED ANXIETY DISORDER: ICD-10-CM

## 2022-05-31 DIAGNOSIS — E11.9 CONTROLLED TYPE 2 DIABETES MELLITUS WITHOUT COMPLICATION, WITHOUT LONG-TERM CURRENT USE OF INSULIN (HCC): Primary | ICD-10-CM

## 2022-05-31 PROCEDURE — 3075F SYST BP GE 130 - 139MM HG: CPT | Performed by: FAMILY MEDICINE

## 2022-05-31 PROCEDURE — 3079F DIAST BP 80-89 MM HG: CPT | Performed by: FAMILY MEDICINE

## 2022-05-31 PROCEDURE — 99214 OFFICE O/P EST MOD 30 MIN: CPT | Performed by: FAMILY MEDICINE

## 2022-05-31 PROCEDURE — 1160F RVW MEDS BY RX/DR IN RCRD: CPT | Performed by: FAMILY MEDICINE

## 2022-05-31 PROCEDURE — 3008F BODY MASS INDEX DOCD: CPT | Performed by: FAMILY MEDICINE

## 2022-05-31 PROCEDURE — 1036F TOBACCO NON-USER: CPT | Performed by: FAMILY MEDICINE

## 2022-05-31 NOTE — PROGRESS NOTES
Assessment/Plan:    Controlled type 2 diabetes mellitus without complication, without long-term current use of insulin ([facility])    Lab Results   Component Value Date    HGBA1C 6 3 (H) 05/21/2022   Diet controlled, continue to monitor  Recheck in 6 months    Dyslipidemia  Diet controlled, continue to monitor, recheck in 6 months    Generalized anxiety disorder  Doing well on duloxetine 60 mg daily, continue same  Recheck 6 months    Acquired hypothyroidism  Thyroid is therapeutic with levothyroxine 100 mcg daily  Continue same  Recheck in 6 months    Vitamin D deficiency  Vitamin-D is improved, continue over-the-counter supplementation  Diagnoses and all orders for this visit:    Controlled type 2 diabetes mellitus without complication, without long-term current use of insulin ([facility]CC)  -     CBC and differential; Future  -     Comprehensive metabolic panel; Future  -     Hemoglobin A1C; Future  -     Microalbumin / creatinine urine ratio; Future    Need for hepatitis C screening test    Screening for HIV (human immunodeficiency virus)    Dyslipidemia  -     CBC and differential; Future  -     Comprehensive metabolic panel; Future  -     Lipid panel; Future    Acquired hypothyroidism  -     TSH, 3rd generation; Future  -     T4; Future    Vitamin D deficiency  -     Vitamin D 25 hydroxy; Future    Generalized anxiety disorder  -     CBC and differential; Future  -     Comprehensive metabolic panel; Future  -     TSH, 3rd generation; Future  -     T4; Future          Subjective:   Chief Complaint   Patient presents with    Follow-up          Patient ID: Laurie Lozano is a 73 y o  female  She is here for follow-up on her diet-controlled diabetes mellitus, diet controlled dyslipidemia, thyroid, blood pressure, anxiety, and review recent lab work  Overall she feels well  Stress levels have not changed, but she is coping well        The following portions of the patient's history were reviewed and updated as appropriate: allergies, current medications, past family history, past medical history, past social history, past surgical history and problem list     Review of Systems   Constitutional: Negative for activity change, chills, diaphoresis, fatigue, fever and unexpected weight change  HENT: Negative for congestion, ear pain, hearing loss, nosebleeds, postnasal drip, rhinorrhea, sinus pressure, sore throat and tinnitus  Eyes: Negative for photophobia, pain, discharge, redness and visual disturbance  Respiratory: Negative for cough, chest tightness, shortness of breath and wheezing  Cardiovascular: Negative for chest pain, palpitations and leg swelling  Denies NEWSOME   Gastrointestinal: Negative for abdominal pain, blood in stool, constipation, diarrhea, nausea and vomiting  Endocrine: Negative  Genitourinary: Negative for difficulty urinating, dysuria, frequency, hematuria and urgency  Musculoskeletal: Negative for arthralgias, joint swelling and myalgias  Skin: Negative for rash and wound  Denies skin lesions, change in birthmarks   Allergic/Immunologic: Negative for environmental allergies, food allergies and immunocompromised state  Neurological: Negative for dizziness, tremors, seizures, syncope, weakness, numbness and headaches  Hematological: Negative  Psychiatric/Behavioral: Negative for behavioral problems, confusion, decreased concentration and sleep disturbance  The patient is not nervous/anxious  Objective:      /80   Temp 97 9 °F (36 6 °C)   Ht 5' 2" (1 575 m)   Wt 70 8 kg (156 lb)   BMI 28 53 kg/m²          Physical Exam  Constitutional:       General: She is not in acute distress  Appearance: She is well-developed  HENT:      Head: Normocephalic and atraumatic        Right Ear: Tympanic membrane, ear canal and external ear normal       Left Ear: Tympanic membrane, ear canal and external ear normal       Nose: Nose normal    Eyes: Conjunctiva/sclera: Conjunctivae normal       Pupils: Pupils are equal, round, and reactive to light  Neck:      Thyroid: No thyromegaly  Vascular: No JVD  Trachea: No tracheal deviation  Cardiovascular:      Rate and Rhythm: Normal rate and regular rhythm  Heart sounds: Normal heart sounds  No murmur heard  Pulmonary:      Effort: Pulmonary effort is normal       Breath sounds: Normal breath sounds  No wheezing or rales  Abdominal:      General: Bowel sounds are normal       Palpations: Abdomen is soft  There is no mass  Tenderness: There is no abdominal tenderness  There is no guarding or rebound  Musculoskeletal:         General: No tenderness  Normal range of motion  Cervical back: Normal range of motion and neck supple  Lymphadenopathy:      Cervical: No cervical adenopathy  Skin:     General: Skin is warm and dry  Findings: No lesion or rash  Neurological:      Mental Status: She is alert and oriented to person, place, and time  Cranial Nerves: No cranial nerve deficit  Deep Tendon Reflexes: Reflexes are normal and symmetric     Psychiatric:         Judgment: Judgment normal

## 2022-05-31 NOTE — ASSESSMENT & PLAN NOTE
Lab Results   Component Value Date    HGBA1C 6 3 (H) 05/21/2022   Diet controlled, continue to monitor    Recheck in 6 months

## 2022-10-18 LAB
LEFT EYE DIABETIC RETINOPATHY: NORMAL
RIGHT EYE DIABETIC RETINOPATHY: NORMAL

## 2022-11-03 NOTE — PROGRESS NOTES
Kannan Oviedo is a 73 y o  female who presents for annual exam   Last Pap smear 10/16/20 NILM/ HR HPV(-)  Reviewed with patient per [facility] guidelines Pap smears are no longer recommended  Last mammogram 21 Birads 2  CRC screening colonoscopy 21  DEXA scan 21- WNL  The patient has no complaints today  The patient is sexually active  The patient is not taking hormone replacement therapy  Patient denies post-menopausal vaginal bleeding    The patient wears seatbelts: yes  The patient participates in regular exercise: yes  The patient reports that there is not domestic violence in her life  Has noticed vaginal irritation and pelvic pain in the past few weeks  Denies alleviating/aggravating factors  Has not used any OTC use  Menstrual History:  OB History        1    Para   1    Term   1            AB        Living   1       SAB        IAB        Ectopic        Multiple        Live Births                    Menarche age: 7  No LMP recorded  Patient is postmenopausal        The following portions of the patient's history were reviewed and updated as appropriate: allergies, current medications, past family history, past medical history, past social history, past surgical history and problem list     Review of Systems  Pertinent items are noted in HPI       Objective      /82   Ht 5' 2" (1 575 m)   Wt 73 9 kg (163 lb)   BMI 29 81 kg/m²     General:   alert and oriented, in no acute distress   Heart: regular rate and rhythm, S1, S2 normal, no murmur, click, rub or gallop   Lungs: clear to auscultation bilaterally   Abdomen: soft, non-tender, without masses or organomegaly   Vulva: normal, Bartholin's, Urethra, [place]'s normal   Vagina: normal mucosa, moderate amount of white/thin discharge, no palpable nodules   Cervix: no cervical motion tenderness and no lesions   Uterus: normal size, non-tender, normal shape and consistency   Adnexa: normal adnexa and no mass, fullness, tenderness   Breast:  breasts appear normal, no suspicious masses, no skin or nipple changes or axillary nodes, surgical scars noted bilateral anchor scars noted  Assessment/Plan     Diagnoses and all orders for this visit:    Well woman exam with routine gynecological exam    Breast cancer screening by mammogram  -     Mammo screening bilateral w 3d & cad; Future    Bacterial vaginosis    Vaginal irritation      Reviewed diagnosis of bacterial vaginosis and treatment with metronidazole  Written information provided  Patient verbalized understanding and avoid alcohol use while taking medication  Reviewed with patient if pain is no better after treatment would recommend pelvic ultrasound  Encouraged use coconut oil as needed for dryness  Encouraged healthy diet, exercise and lifestyle  Encouraged follow-up with PCP and specialists as needed  Encouraged seasonal influenza vaccination  Encouraged supplementation with calcium, vitamin-D and strength training exercises for good bone health  Encouraged social distancing, good hand hygiene, masking and avoiding crowds  Written information provided and COVID-19  Had vaccine x 2 and booster   Will call/ Sp[employer]u message  with results  VBI-   BMI Counseling: Body mass index is 29 81 kg/m²  The BMI is above normal  Nutrition recommendations include reducing portion sizes, decreasing overall calorie intake and 3-5 servings of fruits/vegetables daily  Exercise recommendations include exercising 3-5 times per week, joining a gym and strength training exercises

## 2022-11-04 ENCOUNTER — ANNUAL EXAM (OUTPATIENT)
Dept: OBGYN CLINIC | Facility: MEDICAL CENTER | Age: 67
End: 2022-11-04

## 2022-11-04 VITALS
WEIGHT: 163 LBS | DIASTOLIC BLOOD PRESSURE: 82 MMHG | HEIGHT: 62 IN | BODY MASS INDEX: 30 KG/M2 | SYSTOLIC BLOOD PRESSURE: 142 MMHG

## 2022-11-04 DIAGNOSIS — Z01.419 WELL WOMAN EXAM WITH ROUTINE GYNECOLOGICAL EXAM: Primary | ICD-10-CM

## 2022-11-04 DIAGNOSIS — Z12.31 BREAST CANCER SCREENING BY MAMMOGRAM: ICD-10-CM

## 2022-11-04 DIAGNOSIS — B96.89 BACTERIAL VAGINOSIS: ICD-10-CM

## 2022-11-04 DIAGNOSIS — N76.0 BACTERIAL VAGINOSIS: ICD-10-CM

## 2022-11-04 DIAGNOSIS — N89.8 VAGINAL IRRITATION: ICD-10-CM

## 2022-11-04 LAB
BV WHIFF TEST VAG QL: ABNORMAL
CLUE CELLS SPEC QL WET PREP: PRESENT
PH SMN: 5.5 [PH]
SL AMB POCT WET MOUNT: ABNORMAL
T VAGINALIS VAG QL WET PREP: ABNORMAL
YEAST VAG QL WET PREP: ABNORMAL

## 2022-11-04 RX ORDER — METRONIDAZOLE 500 MG/1
500 TABLET ORAL EVERY 12 HOURS SCHEDULED
Qty: 14 TABLET | Refills: 0 | Status: SHIPPED | OUTPATIENT
Start: 2022-11-04 | End: 2022-11-08

## 2022-11-08 ENCOUNTER — TELEPHONE (OUTPATIENT)
Dept: OBGYN CLINIC | Facility: MEDICAL CENTER | Age: 67
End: 2022-11-08

## 2022-11-08 DIAGNOSIS — B96.89 BACTERIAL VAGINOSIS: Primary | ICD-10-CM

## 2022-11-08 DIAGNOSIS — N76.0 BACTERIAL VAGINOSIS: Primary | ICD-10-CM

## 2022-11-08 RX ORDER — METRONIDAZOLE 7.5 MG/G
1 GEL VAGINAL
Qty: 70 G | Refills: 0 | Status: SHIPPED | OUTPATIENT
Start: 2022-11-08 | End: 2022-11-13

## 2022-11-08 NOTE — TELEPHONE ENCOUNTER
Pt called stating Metronidazole gives her stomach pains, is there another medication that she could be prescribed? Please let patient know when this is sent  Thank you

## 2022-11-23 DIAGNOSIS — F41.1 GENERALIZED ANXIETY DISORDER: ICD-10-CM

## 2022-11-23 RX ORDER — DULOXETIN HYDROCHLORIDE 60 MG/1
CAPSULE, DELAYED RELEASE ORAL
Qty: 90 CAPSULE | Refills: 1 | Status: SHIPPED | OUTPATIENT
Start: 2022-11-23 | End: 2022-11-28 | Stop reason: SDUPTHER

## 2022-11-28 DIAGNOSIS — F41.1 GENERALIZED ANXIETY DISORDER: ICD-10-CM

## 2022-11-28 RX ORDER — DULOXETIN HYDROCHLORIDE 60 MG/1
60 CAPSULE, DELAYED RELEASE ORAL DAILY
Qty: 90 CAPSULE | Refills: 1 | Status: SHIPPED | OUTPATIENT
Start: 2022-11-28

## 2022-12-05 ENCOUNTER — RA CDI HCC (OUTPATIENT)
Dept: OTHER | Facility: HOSPITAL | Age: 67
End: 2022-12-05

## 2022-12-05 NOTE — PROGRESS NOTES
[facility]  coding opportunities       Chart reviewed, no opportunity found: CHART REVIEWED, NO OPPORTUNITY FOUND        Patients Insurance        Commercial Insurance: [address] [place] [address]

## 2022-12-06 ENCOUNTER — TELEPHONE (OUTPATIENT)
Dept: ADMINISTRATIVE | Facility: OTHER | Age: 67
End: 2022-12-06

## 2022-12-06 NOTE — TELEPHONE ENCOUNTER
----- Message from Terence Wolf sent at 12/6/2022  8:39 AM EST -----  Regarding: care gap request - mammo  12/06/22 8:39 AM    Hello, our patient attached above has had Mammogram completed/performed  Please assist in updating the patient chart by pulling the Care Everywhere (CE) document  The date of service is 11/29/2022       Thank you,  [facility]

## 2022-12-06 NOTE — TELEPHONE ENCOUNTER
Upon review of the In Basket request we were able to locate, review, and update the patient chart as requested for Mammogram     Any additional questions or concerns should be emailed to the Practice Liaisons via the appropriate education email address, please do not reply via In Basket      Thank you  Maisha Gallgeos

## 2022-12-10 ENCOUNTER — APPOINTMENT (OUTPATIENT)
Dept: LAB | Facility: HOSPITAL | Age: 67
End: 2022-12-10

## 2022-12-10 DIAGNOSIS — F41.1 GENERALIZED ANXIETY DISORDER: ICD-10-CM

## 2022-12-10 DIAGNOSIS — E11.9 CONTROLLED TYPE 2 DIABETES MELLITUS WITHOUT COMPLICATION, WITHOUT LONG-TERM CURRENT USE OF INSULIN (HCC): ICD-10-CM

## 2022-12-10 DIAGNOSIS — E03.9 ACQUIRED HYPOTHYROIDISM: ICD-10-CM

## 2022-12-10 DIAGNOSIS — E78.5 DYSLIPIDEMIA: ICD-10-CM

## 2022-12-10 DIAGNOSIS — E55.9 VITAMIN D DEFICIENCY: ICD-10-CM

## 2022-12-10 LAB
25(OH)D3 SERPL-MCNC: 33.7 NG/ML (ref 30–100)
ALBUMIN SERPL BCP-MCNC: 3.4 G/DL (ref 3.5–5)
ALP SERPL-CCNC: 81 U/L (ref 46–116)
ALT SERPL W P-5'-P-CCNC: 51 U/L (ref 12–78)
ANION GAP SERPL CALCULATED.3IONS-SCNC: 6 MMOL/L (ref 4–13)
AST SERPL W P-5'-P-CCNC: 36 U/L (ref 5–45)
BASOPHILS # BLD AUTO: 0.03 THOUSANDS/ÂΜL (ref 0–0.1)
BASOPHILS NFR BLD AUTO: 0 % (ref 0–1)
BILIRUB SERPL-MCNC: 0.38 MG/DL (ref 0.2–1)
BUN SERPL-MCNC: 20 MG/DL (ref 5–25)
CALCIUM ALBUM COR SERPL-MCNC: 9.7 MG/DL (ref 8.3–10.1)
CALCIUM SERPL-MCNC: 9.2 MG/DL (ref 8.3–10.1)
CHLORIDE SERPL-SCNC: 107 MMOL/L (ref 96–108)
CHOLEST SERPL-MCNC: 195 MG/DL
CO2 SERPL-SCNC: 26 MMOL/L (ref 21–32)
CREAT SERPL-MCNC: 0.95 MG/DL (ref 0.6–1.3)
CREAT UR-MCNC: 95.6 MG/DL
EOSINOPHIL # BLD AUTO: 0.1 THOUSAND/ÂΜL (ref 0–0.61)
EOSINOPHIL NFR BLD AUTO: 2 % (ref 0–6)
ERYTHROCYTE [DISTWIDTH] IN BLOOD BY AUTOMATED COUNT: 15.3 % (ref 11.6–15.1)
EST. AVERAGE GLUCOSE BLD GHB EST-MCNC: 131 MG/DL
GFR SERPL CREATININE-BSD FRML MDRD: 63 ML/MIN/1.73SQ M
GLUCOSE P FAST SERPL-MCNC: 103 MG/DL (ref 65–99)
HBA1C MFR BLD: 6.2 %
HCT VFR BLD AUTO: 43 % (ref 34.8–46.1)
HDLC SERPL-MCNC: 49 MG/DL
HGB BLD-MCNC: 13.6 G/DL (ref 11.5–15.4)
IMM GRANULOCYTES # BLD AUTO: 0.02 THOUSAND/UL (ref 0–0.2)
IMM GRANULOCYTES NFR BLD AUTO: 0 % (ref 0–2)
LDLC SERPL CALC-MCNC: 114 MG/DL (ref 0–100)
LYMPHOCYTES # BLD AUTO: 2.61 THOUSANDS/ÂΜL (ref 0.6–4.47)
LYMPHOCYTES NFR BLD AUTO: 38 % (ref 14–44)
MCH RBC QN AUTO: 27.3 PG (ref 26.8–34.3)
MCHC RBC AUTO-ENTMCNC: 31.6 G/DL (ref 31.4–37.4)
MCV RBC AUTO: 86 FL (ref 82–98)
MICROALBUMIN UR-MCNC: 9.2 MG/L (ref 0–20)
MICROALBUMIN/CREAT 24H UR: 10 MG/G CREATININE (ref 0–30)
MONOCYTES # BLD AUTO: 0.62 THOUSAND/ÂΜL (ref 0.17–1.22)
MONOCYTES NFR BLD AUTO: 9 % (ref 4–12)
NEUTROPHILS # BLD AUTO: 3.41 THOUSANDS/ÂΜL (ref 1.85–7.62)
NEUTS SEG NFR BLD AUTO: 51 % (ref 43–75)
NONHDLC SERPL-MCNC: 146 MG/DL
NRBC BLD AUTO-RTO: 0 /100 WBCS
PLATELET # BLD AUTO: 302 THOUSANDS/UL (ref 149–390)
PMV BLD AUTO: 11.4 FL (ref 8.9–12.7)
POTASSIUM SERPL-SCNC: 4.2 MMOL/L (ref 3.5–5.3)
PROT SERPL-MCNC: 7.4 G/DL (ref 6.4–8.4)
RBC # BLD AUTO: 4.99 MILLION/UL (ref 3.81–5.12)
SODIUM SERPL-SCNC: 139 MMOL/L (ref 135–147)
T4 SERPL-MCNC: 7.1 UG/DL (ref 4.7–13.3)
TRIGL SERPL-MCNC: 161 MG/DL
TSH SERPL DL<=0.05 MIU/L-ACNC: 1.26 UIU/ML (ref 0.45–4.5)
WBC # BLD AUTO: 6.79 THOUSAND/UL (ref 4.31–10.16)

## 2022-12-13 ENCOUNTER — OFFICE VISIT (OUTPATIENT)
Dept: FAMILY MEDICINE CLINIC | Facility: CLINIC | Age: 67
End: 2022-12-13

## 2022-12-13 VITALS
HEIGHT: 62 IN | TEMPERATURE: 97.9 F | BODY MASS INDEX: 30.03 KG/M2 | WEIGHT: 163.2 LBS | SYSTOLIC BLOOD PRESSURE: 124 MMHG | DIASTOLIC BLOOD PRESSURE: 82 MMHG

## 2022-12-13 DIAGNOSIS — E03.9 ACQUIRED HYPOTHYROIDISM: ICD-10-CM

## 2022-12-13 DIAGNOSIS — E11.9 CONTROLLED TYPE 2 DIABETES MELLITUS WITHOUT COMPLICATION, WITHOUT LONG-TERM CURRENT USE OF INSULIN (HCC): Primary | ICD-10-CM

## 2022-12-13 DIAGNOSIS — E55.9 VITAMIN D DEFICIENCY: ICD-10-CM

## 2022-12-13 DIAGNOSIS — M13.0 POLYARTHROPATHY: ICD-10-CM

## 2022-12-13 DIAGNOSIS — M79.7 FIBROMYALGIA: ICD-10-CM

## 2022-12-13 DIAGNOSIS — E78.5 DYSLIPIDEMIA: ICD-10-CM

## 2022-12-13 DIAGNOSIS — F41.1 GENERALIZED ANXIETY DISORDER: ICD-10-CM

## 2022-12-13 NOTE — PROGRESS NOTES
Chief Complaint   Patient presents with   • Hypothyroidism   • Anxiety     No refills needed      Name: Byron Petit      : 1955      MRN: [id number]  Encounter Provider: Nini Gaines DO  Encounter Date: 2022   Encounter department: [facility] PRIMARY CARE    Assessment & Plan     1  Controlled type 2 diabetes mellitus without complication, without long-term current use of insulin [facility])  Assessment & Plan:    Lab Results   Component Value Date    HGBA1C 6 2 (H) 12/10/2022   Diet-controlled, recheck 6 months    Orders:  -     Comprehensive metabolic panel; Future; Expected date: 2023  -     Hemoglobin A1C; Future; Expected date: 2023  -     Microalbumin / creatinine urine ratio; Future; Expected date: 2023  -     CBC and Platelet; Future; Expected date: 2023    2  Acquired hypothyroidism  Assessment & Plan:  TSH is therapeutic with levothyroxine 100 mcg daily  Recheck 6 months  Orders:  -     T4; Future; Expected date: 2023  -     TSH, 3rd generation; Future; Expected date: 2023    3  Dyslipidemia  Assessment & Plan:  Improved with diet, patient reluctant to start additional medication  Recheck 6 months      4  Generalized anxiety disorder  Assessment & Plan:  Continue duloxetine 60 mg daily  Recheck 6 months      5  Vitamin D deficiency  Assessment & Plan:  Levels satisfactory, continue over-the-counter supplements  Recheck 1 year  6  Fibromyalgia  -     Ambulatory Referral to Rheumatology; Future    7  Polyarthropathy  Assessment & Plan:  [address] lab, referred to rheumatology  Orders:  -     C-reactive protein; Future; Expected date: 2023  -     RF Screen w/ Reflex to Titer; Future; Expected date: 2023  -     TRACIE Screen w/ Reflex to Titer/Pattern; Future; Expected date: 2023  -     Ambulatory Referral to Rheumatology;  Future         Subjective      She is here for follow-up on her diet-controlled diabetes, hypothyroidism, joint pain, cholesterol, she had recent lab work which she would like to review  Recently saw the eye doctor  He is up-to-date with colonoscopy will be due in 2031 for her next one  Review of Systems   Constitutional: Negative for activity change, chills, diaphoresis, fatigue, fever and unexpected weight change  HENT: Negative for congestion, ear pain, hearing loss, nosebleeds, postnasal drip, rhinorrhea, sinus pressure, sore throat and tinnitus  Eyes: Negative for photophobia, pain, discharge, redness and visual disturbance  Respiratory: Negative for cough, chest tightness, shortness of breath and wheezing  Cardiovascular: Negative for chest pain, palpitations and leg swelling  Denies NEWSOME   Gastrointestinal: Negative for abdominal pain, blood in stool, constipation, diarrhea, nausea and vomiting  Endocrine: Negative  Genitourinary: Negative for difficulty urinating, dysuria, frequency, hematuria and urgency  Musculoskeletal: Positive for arthralgias (Generalized, worse in her shoulders and knees  )  Negative for joint swelling and myalgias  Skin: Negative for rash and wound  Denies skin lesions, change in birthmarks   Allergic/Immunologic: Negative for environmental allergies, food allergies and immunocompromised state  Neurological: Negative for dizziness, tremors, seizures, syncope, weakness, numbness and headaches  Hematological: Negative  Psychiatric/Behavioral: Negative for behavioral problems, confusion, decreased concentration and sleep disturbance  The patient is not nervous/anxious          Current Outpatient Medications on File Prior to Visit   Medication Sig   • DULoxetine (CYMBALTA) 60 mg delayed release capsule Take 1 capsule (60 mg total) by mouth daily   • levothyroxine 100 mcg tablet TAKE 1 TABLET BY MOUTH DAILY   • Specialty Vitamins Products (magnesium, amino acid chelate,) 133 MG tablet Take 133 mg by mouth daily       Objective     BP 124/82   Temp 97 9 °F (36 6 °C)   Ht 5' 2" (1 575 m)   Wt 74 kg (163 lb 3 2 oz)   BMI 29 85 kg/m²     Physical Exam  Vitals and nursing note reviewed  Constitutional:       General: She is not in acute distress  Appearance: She is well-developed  HENT:      Head: Normocephalic and atraumatic  Right Ear: Tympanic membrane, ear canal and external ear normal       Left Ear: Tympanic membrane, ear canal and external ear normal       Nose: Nose normal    Eyes:      Conjunctiva/sclera: Conjunctivae normal       Pupils: Pupils are equal, round, and reactive to light  Neck:      Thyroid: No thyromegaly  Vascular: No JVD  Trachea: No tracheal deviation  Cardiovascular:      Rate and Rhythm: Normal rate and regular rhythm  Pulses: no weak pulses          Dorsalis pedis pulses are 2+ on the right side and 2+ on the left side  Posterior tibial pulses are 2+ on the right side and 2+ on the left side  Heart sounds: Normal heart sounds  No murmur heard  Pulmonary:      Effort: Pulmonary effort is normal       Breath sounds: Normal breath sounds  No wheezing or rales  Abdominal:      General: Bowel sounds are normal       Palpations: Abdomen is soft  There is no mass  Tenderness: There is no abdominal tenderness  There is no guarding or rebound  Musculoskeletal:         General: No tenderness  Normal range of motion  Cervical back: Normal range of motion and neck supple  Feet:      Right foot:      Skin integrity: No ulcer, skin breakdown, erythema, warmth, callus or dry skin  Left foot:      Skin integrity: No ulcer, skin breakdown, erythema, warmth, callus or dry skin  Lymphadenopathy:      Cervical: No cervical adenopathy  Skin:     General: Skin is warm and dry  Capillary Refill: Capillary refill takes less than 2 seconds  Findings: No lesion or rash  Neurological:      General: No focal deficit present        Mental Status: She is alert and oriented to person, place, and time  Cranial Nerves: No cranial nerve deficit  Deep Tendon Reflexes: Reflexes are normal and symmetric  Psychiatric:         Mood and Affect: Mood normal          Behavior: Behavior normal          Thought Content: Thought content normal          Judgment: Judgment normal        Jetty Erp, DO Diabetic Foot Exam    Patient's shoes and socks removed  Right Foot/Ankle   Right Foot Inspection  Skin Exam: skin normal  Skin not intact, no dry skin, no warmth, no callus, no erythema, no maceration, no abnormal color, no pre-ulcer, no ulcer and no callus  Toe Exam: ROM and strength within normal limits  Sensory   Vibration: intact  Proprioception: intact  Monofilament testing: intact    Vascular  Capillary refills: < 3 seconds  The right DP pulse is 2+  The right PT pulse is 2+  Left Foot/Ankle  Left Foot Inspection  Skin Exam: skin normal  Skin not intact, no dry skin, no warmth, no erythema, no maceration, normal color, no pre-ulcer, no ulcer and no callus  Toe Exam: ROM and strength within normal limits  Sensory   Vibration: intact  Proprioception: intact  Monofilament testing: intact    Vascular  Capillary refills: < 3 seconds  The left DP pulse is 2+  The left PT pulse is 2+       Assign Risk Category  No deformity present  No loss of protective sensation  No weak pulses  Risk: 0

## 2023-02-09 ENCOUNTER — OFFICE VISIT (OUTPATIENT)
Dept: FAMILY MEDICINE CLINIC | Facility: CLINIC | Age: 68
End: 2023-02-09

## 2023-02-09 VITALS
WEIGHT: 161.8 LBS | TEMPERATURE: 97.5 F | HEIGHT: 62 IN | DIASTOLIC BLOOD PRESSURE: 78 MMHG | BODY MASS INDEX: 29.77 KG/M2 | SYSTOLIC BLOOD PRESSURE: 120 MMHG

## 2023-02-09 DIAGNOSIS — E11.9 CONTROLLED TYPE 2 DIABETES MELLITUS WITHOUT COMPLICATION, WITHOUT LONG-TERM CURRENT USE OF INSULIN (HCC): ICD-10-CM

## 2023-02-09 DIAGNOSIS — J01.00 ACUTE NON-RECURRENT MAXILLARY SINUSITIS: Primary | ICD-10-CM

## 2023-02-09 RX ORDER — AMOXICILLIN 875 MG/1
875 TABLET, COATED ORAL 2 TIMES DAILY
Qty: 20 TABLET | Refills: 0 | Status: SHIPPED | OUTPATIENT
Start: 2023-02-09 | End: 2023-02-19

## 2023-02-09 RX ORDER — PREDNISONE 10 MG/1
TABLET ORAL
Qty: 15 TABLET | Refills: 0 | Status: SHIPPED | OUTPATIENT
Start: 2023-02-09

## 2023-02-09 NOTE — PROGRESS NOTES
Chief Complaint   Patient presents with   • Cough   • Nasal Congestion   • Sore Throat     X  2 wks      Name: Bedelia Shone      : 1955      MRN: [id number]  Encounter Provider: Maribell Zamora DO  Encounter Date: 2023   Encounter department: [facility] PRIMARY CARE    Assessment & Plan     1  Acute non-recurrent maxillary sinusitis  Comments:  Medication as directed, call the office if symptoms do not improve  Orders:  -     amoxicillin (AMOXIL) 875 mg tablet; Take 1 tablet (875 mg total) by mouth 2 (two) times a day for 10 days  -     predniSONE 10 mg tablet; 5 x 1 day, 4 x1 day, 3 x 1 day,2 x1 day,  1 x 1 day    2  Controlled type 2 diabetes mellitus without complication, without long-term current use of insulin ([facility])  Comments:  Patient aware prednisone may raise her sugars,           Subjective      She is complaining of sinus congestion, coughing, sore throat for 2 weeks  She did a COVID test last week, it was negative  Multiple coworkers are out sick at this time  Symptoms have been getting worse, they have plateaued over the last couple of days  Review of Systems   Constitutional: Positive for activity change, chills and fatigue  Negative for appetite change and fever  HENT: Positive for congestion, postnasal drip, sinus pressure, sinus pain, sore throat and trouble swallowing  Negative for ear pain and rhinorrhea  Eyes: Negative for pain and visual disturbance  Respiratory: Positive for cough  Negative for choking, chest tightness and shortness of breath  Cardiovascular: Negative for chest pain and palpitations  Gastrointestinal: Negative for abdominal pain and vomiting  Genitourinary: Negative for dysuria and hematuria  Musculoskeletal: Negative for arthralgias and back pain  Skin: Negative for color change and rash  Neurological: Negative for seizures and syncope  All other systems reviewed and are negative        Current Outpatient Medications on File Prior to Visit   Medication Sig   • DULoxetine (CYMBALTA) 60 mg delayed release capsule Take 1 capsule (60 mg total) by mouth daily   • levothyroxine 100 mcg tablet TAKE 1 TABLET BY MOUTH DAILY   • Specialty Vitamins Products (magnesium, amino acid chelate,) 133 MG tablet Take 133 mg by mouth daily       Objective     /78   Temp 97 5 °F (36 4 °C)   Ht 5' 2" (1 575 m)   Wt 73 4 kg (161 lb 12 8 oz)   BMI 29 59 kg/m²     Physical Exam  Constitutional:       General: She is not in acute distress  Appearance: She is well-developed  HENT:      Head: Normocephalic and atraumatic  Right Ear: A middle ear effusion is present  Left Ear: A middle ear effusion is present  Nose: Mucosal edema, congestion and rhinorrhea present  Right Turbinates: Enlarged and swollen  Left Turbinates: Enlarged and swollen  Right Sinus: Maxillary sinus tenderness present  No frontal sinus tenderness  Left Sinus: Maxillary sinus tenderness present  No frontal sinus tenderness  Mouth/Throat:      Mouth: Mucous membranes are moist  No oral lesions  Pharynx: No pharyngeal swelling or posterior oropharyngeal erythema  Eyes:      Conjunctiva/sclera: Conjunctivae normal    Cardiovascular:      Rate and Rhythm: Normal rate and regular rhythm  Heart sounds: Normal heart sounds  Pulmonary:      Effort: Pulmonary effort is normal    Abdominal:      Palpations: Abdomen is soft  Musculoskeletal:      Cervical back: Normal range of motion and neck supple  Skin:     General: Skin is warm  Capillary Refill: Capillary refill takes less than 2 seconds  Neurological:      General: No focal deficit present  Mental Status: She is alert and oriented to person, place, and time     Psychiatric:         Mood and Affect: Mood normal          Judgment: Judgment normal        Clifford Cano DO

## 2023-04-28 DIAGNOSIS — E03.9 ACQUIRED HYPOTHYROIDISM: ICD-10-CM

## 2023-04-28 RX ORDER — LEVOTHYROXINE SODIUM 0.1 MG/1
TABLET ORAL
Qty: 90 TABLET | Refills: 1 | Status: SHIPPED | OUTPATIENT
Start: 2023-04-28

## 2023-05-23 DIAGNOSIS — F41.1 GENERALIZED ANXIETY DISORDER: ICD-10-CM

## 2023-05-23 RX ORDER — DULOXETIN HYDROCHLORIDE 60 MG/1
60 CAPSULE, DELAYED RELEASE ORAL DAILY
Qty: 90 CAPSULE | Refills: 1 | Status: SHIPPED | OUTPATIENT
Start: 2023-05-23

## 2023-05-30 ENCOUNTER — TELEPHONE (OUTPATIENT)
Dept: FAMILY MEDICINE CLINIC | Facility: CLINIC | Age: 68
End: 2023-05-30

## 2023-05-30 DIAGNOSIS — M13.0 POLYARTHROPATHY: Primary | ICD-10-CM

## 2023-05-30 DIAGNOSIS — M79.7 FIBROMYALGIA: ICD-10-CM

## 2023-05-30 NOTE — TELEPHONE ENCOUNTER
Will be seeing Dr Bernice Urias for rheumatology for fibromyalgia    Dr Minnie Quiroz office needs a provider referral and chart notes faxed to [phone]

## 2023-06-17 ENCOUNTER — APPOINTMENT (OUTPATIENT)
Dept: LAB | Facility: HOSPITAL | Age: 68
End: 2023-06-17
Payer: COMMERCIAL

## 2023-06-17 DIAGNOSIS — M13.0 POLYARTHROPATHY: ICD-10-CM

## 2023-06-17 DIAGNOSIS — E11.9 CONTROLLED TYPE 2 DIABETES MELLITUS WITHOUT COMPLICATION, WITHOUT LONG-TERM CURRENT USE OF INSULIN (HCC): ICD-10-CM

## 2023-06-17 DIAGNOSIS — E03.9 ACQUIRED HYPOTHYROIDISM: ICD-10-CM

## 2023-06-17 LAB
ALBUMIN SERPL BCP-MCNC: 4.3 G/DL (ref 3.5–5)
ALP SERPL-CCNC: 76 U/L (ref 34–104)
ALT SERPL W P-5'-P-CCNC: 43 U/L (ref 7–52)
ANION GAP SERPL CALCULATED.3IONS-SCNC: 5 MMOL/L (ref 4–13)
AST SERPL W P-5'-P-CCNC: 33 U/L (ref 13–39)
BILIRUB SERPL-MCNC: 0.5 MG/DL (ref 0.2–1)
BUN SERPL-MCNC: 19 MG/DL (ref 5–25)
CALCIUM SERPL-MCNC: 9.5 MG/DL (ref 8.4–10.2)
CHLORIDE SERPL-SCNC: 103 MMOL/L (ref 96–108)
CO2 SERPL-SCNC: 29 MMOL/L (ref 21–32)
CREAT SERPL-MCNC: 1.03 MG/DL (ref 0.6–1.3)
CREAT UR-MCNC: 140 MG/DL
CRP SERPL QL: 5.8 MG/L
ERYTHROCYTE [DISTWIDTH] IN BLOOD BY AUTOMATED COUNT: 14.9 % (ref 11.6–15.1)
EST. AVERAGE GLUCOSE BLD GHB EST-MCNC: 131 MG/DL
GFR SERPL CREATININE-BSD FRML MDRD: 56 ML/MIN/1.73SQ M
GLUCOSE P FAST SERPL-MCNC: 101 MG/DL (ref 65–99)
HBA1C MFR BLD: 6.2 %
HCT VFR BLD AUTO: 44.6 % (ref 34.8–46.1)
HGB BLD-MCNC: 14.1 G/DL (ref 11.5–15.4)
MCH RBC QN AUTO: 26.9 PG (ref 26.8–34.3)
MCHC RBC AUTO-ENTMCNC: 31.6 G/DL (ref 31.4–37.4)
MCV RBC AUTO: 85 FL (ref 82–98)
MICROALBUMIN UR-MCNC: 11.2 MG/L (ref 0–20)
MICROALBUMIN/CREAT 24H UR: 8 MG/G CREATININE (ref 0–30)
PLATELET # BLD AUTO: 298 THOUSANDS/UL (ref 149–390)
PMV BLD AUTO: 11.7 FL (ref 8.9–12.7)
POTASSIUM SERPL-SCNC: 4.3 MMOL/L (ref 3.5–5.3)
PROT SERPL-MCNC: 7.5 G/DL (ref 6.4–8.4)
RBC # BLD AUTO: 5.25 MILLION/UL (ref 3.81–5.12)
SODIUM SERPL-SCNC: 137 MMOL/L (ref 135–147)
T4 SERPL-MCNC: 5.83 UG/DL (ref 6.09–12.23)
TSH SERPL DL<=0.05 MIU/L-ACNC: 2.56 UIU/ML (ref 0.45–4.5)
WBC # BLD AUTO: 6.61 THOUSAND/UL (ref 4.31–10.16)

## 2023-06-17 PROCEDURE — 80053 COMPREHEN METABOLIC PANEL: CPT

## 2023-06-17 PROCEDURE — 84436 ASSAY OF TOTAL THYROXINE: CPT

## 2023-06-17 PROCEDURE — 84443 ASSAY THYROID STIM HORMONE: CPT

## 2023-06-17 PROCEDURE — 86140 C-REACTIVE PROTEIN: CPT

## 2023-06-17 PROCEDURE — 82570 ASSAY OF URINE CREATININE: CPT

## 2023-06-17 PROCEDURE — 85027 COMPLETE CBC AUTOMATED: CPT

## 2023-06-17 PROCEDURE — 82043 UR ALBUMIN QUANTITATIVE: CPT

## 2023-06-17 PROCEDURE — 36415 COLL VENOUS BLD VENIPUNCTURE: CPT

## 2023-06-17 PROCEDURE — 86430 RHEUMATOID FACTOR TEST QUAL: CPT

## 2023-06-17 PROCEDURE — 86038 ANTINUCLEAR ANTIBODIES: CPT

## 2023-06-17 PROCEDURE — 83036 HEMOGLOBIN GLYCOSYLATED A1C: CPT

## 2023-06-18 LAB
ANA SER QL IA: NEGATIVE
RHEUMATOID FACT SER QL LA: NEGATIVE

## 2023-06-19 ENCOUNTER — RA CDI HCC (OUTPATIENT)
Dept: OTHER | Facility: HOSPITAL | Age: 68
End: 2023-06-19

## 2023-06-20 ENCOUNTER — OFFICE VISIT (OUTPATIENT)
Dept: FAMILY MEDICINE CLINIC | Facility: CLINIC | Age: 68
End: 2023-06-20
Payer: COMMERCIAL

## 2023-06-20 VITALS
BODY MASS INDEX: 30.18 KG/M2 | HEIGHT: 62 IN | SYSTOLIC BLOOD PRESSURE: 122 MMHG | DIASTOLIC BLOOD PRESSURE: 80 MMHG | WEIGHT: 164 LBS

## 2023-06-20 DIAGNOSIS — E78.5 DYSLIPIDEMIA: ICD-10-CM

## 2023-06-20 DIAGNOSIS — F41.1 GENERALIZED ANXIETY DISORDER: ICD-10-CM

## 2023-06-20 DIAGNOSIS — E11.9 CONTROLLED TYPE 2 DIABETES MELLITUS WITHOUT COMPLICATION, WITHOUT LONG-TERM CURRENT USE OF INSULIN (HCC): Primary | ICD-10-CM

## 2023-06-20 DIAGNOSIS — M79.7 FIBROMYALGIA: ICD-10-CM

## 2023-06-20 DIAGNOSIS — E03.9 ACQUIRED HYPOTHYROIDISM: ICD-10-CM

## 2023-06-20 DIAGNOSIS — E55.9 VITAMIN D DEFICIENCY: ICD-10-CM

## 2023-06-20 PROCEDURE — 99214 OFFICE O/P EST MOD 30 MIN: CPT | Performed by: FAMILY MEDICINE

## 2023-06-20 RX ORDER — DIPHENOXYLATE HYDROCHLORIDE AND ATROPINE SULFATE 2.5; .025 MG/1; MG/1
1 TABLET ORAL DAILY
COMMUNITY

## 2023-06-20 NOTE — PROGRESS NOTES
Chief Complaint   Patient presents with   • Hypothyroidism   • Anxiety     No refills needed      Name: Nini Guillaume      : 1955      MRN: [id number]  Encounter Provider: Annamaria Simon DO  Encounter Date: 2023   Encounter department: [facility] PRIMARY CARE    Assessment & Plan     1  Controlled type 2 diabetes mellitus without complication, without long-term current use of insulin ([facility] [address] )  Comments:  Well-controlled with an HbA1c of 6 2 with diet and lifestyle  Recheck lab in 6 months  Orders:  -     Comprehensive metabolic panel; Future; Expected date: 2023  -     Hemoglobin A1C; Future; Expected date: 2023  -     CBC and Platelet; Future; Expected date: 2023  -     Albumin / creatinine urine ratio; Future; Expected date: 2023  -     Lipid Panel with Direct LDL reflex; Future; Expected date: 2023  -     TSH, 3rd generation with Free T4 reflex; Future; Expected date: 2023    2  Dyslipidemia  Comments:  Controlled with diet and lifestyle modification  Recheck 6 months  Orders:  -     Comprehensive metabolic panel; Future; Expected date: 2023  -     Lipid Panel with Direct LDL reflex; Future; Expected date: 2023  -     TSH, 3rd generation with Free T4 reflex; Future; Expected date: 2023    3  Acquired hypothyroidism  Comments:  Therapeutic TSH with levothyroxine 100 mcg daily  Continue same  Recheck 6 months  Orders:  -     TSH, 3rd generation with Free T4 reflex; Future; Expected date: 2023    4  Fibromyalgia  Comments:  Continue duloxetine and as needed Motrin  Discussed possibly adding gabapentin, patient wants to think about it and discuss with rheumatology  5  Generalized anxiety disorder  Comments:  Continue duloxetine 60 mg daily  Recheck 6 months    6  Vitamin D deficiency      BMI Counseling: Body mass index is 30 kg/m²   The BMI is above normal  Nutrition recommendations include decreasing portion sizes, encouraging healthy choices of fruits and vegetables, moderation in carbohydrate intake, increasing intake of lean protein and reducing intake of saturated and trans fat  Exercise recommendations include exercising 3-5 times per week  Rationale for BMI follow-up plan is due to patient being overweight or obese  Depression Screening and Follow-up Plan: Patient was screened for depression during today's encounter  They screened negative with a PHQ-2 score of 1  Subjective      She presents to the office for follow-up on her diet-controlled diabetes, dyslipidemia, hypothyroidism, fibromyalgia, weight, and a general checkup  She had recent lab work which she would like to review  She is up-to-date with mammogram colonoscopy eye exam   She just saw the podiatrist, they are concerned that she may have a radiculopathy causing pain in her right foot  Her fibromyalgia is under fair control with duloxetine and as needed ibuprofen  Review of Systems   Constitutional: Negative for activity change, chills, diaphoresis, fatigue, fever and unexpected weight change  HENT: Negative for congestion, ear pain, hearing loss, nosebleeds, postnasal drip, rhinorrhea, sinus pressure, sore throat and tinnitus  Eyes: Negative for photophobia, pain, discharge, redness and visual disturbance  Respiratory: Negative for cough, chest tightness, shortness of breath and wheezing  Cardiovascular: Negative for chest pain, palpitations and leg swelling  Denies NEWSOME   Gastrointestinal: Negative for abdominal pain, blood in stool, constipation, diarrhea, nausea and vomiting  Endocrine: Negative  Genitourinary: Negative for difficulty urinating, dysuria, frequency, hematuria and urgency  Musculoskeletal: Negative for arthralgias, joint swelling and myalgias  Skin: Negative for rash and wound          Denies skin lesions, change in birthmarks   Allergic/Immunologic: Negative for environmental allergies, food "allergies and immunocompromised state  Neurological: Negative for dizziness, tremors, seizures, syncope, weakness, numbness and headaches  Hematological: Negative  Psychiatric/Behavioral: Negative for behavioral problems, confusion, decreased concentration and sleep disturbance  The patient is not nervous/anxious  Current Outpatient Medications on File Prior to Visit   Medication Sig   • DULoxetine (CYMBALTA) 60 mg delayed release capsule Take 1 capsule (60 mg total) by mouth daily   • levothyroxine 100 mcg tablet TAKE ONE TABLET BY MOUTH EVERY DAY   • Specialty Vitamins Products (magnesium, amino acid chelate,) 133 MG tablet Take 133 mg by mouth daily   • multivitamin (THERAGRAN) TABS Take 1 tablet by mouth daily   • predniSONE 10 mg tablet 5 x 1 day, 4 x1 day, 3 x 1 day,2 x1 day,  1 x 1 day (Patient not taking: Reported on 6/20/2023)       Objective     /80   Ht 5' 2\" (1 575 m)   Wt 74 4 kg (164 lb)   BMI 30 00 kg/m²     Physical Exam  Vitals and nursing note reviewed  Constitutional:       General: She is not in acute distress  Appearance: She is well-developed  HENT:      Head: Normocephalic and atraumatic  Right Ear: Tympanic membrane, ear canal and external ear normal       Left Ear: Tympanic membrane, ear canal and external ear normal       Nose: Nose normal       Mouth/Throat:      Mouth: Mucous membranes are moist    Eyes:      Conjunctiva/sclera: Conjunctivae normal       Pupils: Pupils are equal, round, and reactive to light  Neck:      Thyroid: No thyromegaly  Vascular: No JVD  Trachea: No tracheal deviation  Cardiovascular:      Rate and Rhythm: Normal rate and regular rhythm  Pulses: Normal pulses  Heart sounds: Normal heart sounds  No murmur heard  Pulmonary:      Effort: Pulmonary effort is normal       Breath sounds: Normal breath sounds  No wheezing or rales     Abdominal:      General: Bowel sounds are normal       Palpations: Abdomen is " soft  There is no mass  Tenderness: There is no abdominal tenderness  There is no guarding or rebound  Musculoskeletal:         General: No tenderness  Normal range of motion  Cervical back: Normal range of motion and neck supple  Lymphadenopathy:      Cervical: No cervical adenopathy  Skin:     General: Skin is warm and dry  Findings: No lesion or rash  Neurological:      General: No focal deficit present  Mental Status: She is alert and oriented to person, place, and time  Cranial Nerves: No cranial nerve deficit  Deep Tendon Reflexes: Reflexes are normal and symmetric     Psychiatric:         Mood and Affect: Mood normal          Judgment: Judgment normal        Rosalind Hardy, DO

## 2023-06-20 NOTE — PROGRESS NOTES
[facility]  coding opportunities     E11 22     Chart Reviewed number of suggestions sent to Provider: 1     Patients Insurance     Medicare Insurance: [employer]

## 2023-09-24 DIAGNOSIS — E03.9 ACQUIRED HYPOTHYROIDISM: ICD-10-CM

## 2023-09-24 DIAGNOSIS — F41.1 GENERALIZED ANXIETY DISORDER: ICD-10-CM

## 2023-09-25 RX ORDER — LEVOTHYROXINE SODIUM 0.1 MG/1
100 TABLET ORAL DAILY
Qty: 90 TABLET | Refills: 3 | Status: SHIPPED | OUTPATIENT
Start: 2023-09-25

## 2023-09-25 RX ORDER — DULOXETIN HYDROCHLORIDE 60 MG/1
60 CAPSULE, DELAYED RELEASE ORAL DAILY
Qty: 90 CAPSULE | Refills: 3 | Status: SHIPPED | OUTPATIENT
Start: 2023-09-25

## 2023-11-02 LAB
LEFT EYE DIABETIC RETINOPATHY: NORMAL
RIGHT EYE DIABETIC RETINOPATHY: NORMAL

## 2023-11-03 NOTE — PROGRESS NOTES
Subjective      Dennie Marshall is a 78 y.o. female who presents for annual exam.  Last Pap smear 10/16/20 NILM/ HR HPV(-). Last mammogram 22 birads 1. Has mammogram scheduled 23 @ [facility]. CRC screening 20 colonoscopy, due for repeat in 10 years. DEXA 21- WNL  The patient has no complaints today. The patient is not currently sexually active. The patient is not taking hormone replacement therapy. Patient denies post-menopausal vaginal bleeding. . The patient wears seatbelts: yes. The patient participates in regular exercise: no. The patient reports that there is not domestic violence in her life. Menstrual History:  OB History          1    Para   1    Term   1            AB        Living   1         SAB        IAB        Ectopic        Multiple        Live Births                    Menarche age: 7.6  No LMP recorded. Patient is postmenopausal.       The following portions of the patient's history were reviewed and updated as appropriate: allergies, current medications, past family history, past medical history, past social history, past surgical history, and problem list.    Review of Systems  Pertinent items are noted in HPI. Objective      /80   Ht 5' 2" (1.575 m)   Wt 76 kg (167 lb 8 oz)   BMI 30.64 kg/m²     General:   alert and oriented, in no acute distress   Heart: regular rate and rhythm, S1, S2 normal, no murmur, click, rub or gallop   Lungs: clear to auscultation bilaterally   Abdomen: soft, non-tender, without masses or organomegaly   Vulva: normal, Bartholin's, Urethra, Grover's normal   Vagina: Pale mucosa, normal discharge, no palpable nodules, narrowed vaginal canal   Cervix: no cervical motion tenderness and no lesions   Uterus: normal size, non-tender, normal shape and consistency   Adnexa: normal adnexa and no mass, fullness, tenderness   Breast:  breasts appear normal, no suspicious masses, no skin or nipple changes or axillary nodes. Bilateral anchor scars noted on exam         Assessment/Plan     Diagnoses and all orders for this visit:    Well woman exam with routine gynecological exam    Postmenopausal estrogen deficiency  -     DXA bone density spine hip and pelvis; Future    Breast cancer screening by mammogram    Screening for osteoporosis  -     DXA bone density spine hip and pelvis; Future    Other orders  -     cholecalciferol (VITAMIN D3) 1,000 units tablet; Take 1,000 Units by mouth daily  -     vitamin B-12 (VITAMIN B-12) 500 mcg tablet; Take 500 mcg by mouth daily    Reviewed vaginal atrophy/narrowed vaginal canal reviewed option for treatment with estrogen cream.  Patient declines. Reviewed vaginal moisturizer/coconut oil. Reviewed vaginal dilators. Reviewed with patient if she needs assistance can make appointment in office or can refer to pelvic floor physical therapy  Encouraged healthy diet, exercise and lifestyle  Encouraged follow-up with PCP and specialists as needed  Encouraged seasonal influenza vaccination  Encouraged supplementation with calcium, vitamin-D and strength training exercises for good bone health  Encouraged social distancing, good hand hygiene, masking and avoiding crowds. Written information provided and COVID-[employer]  Will call / [email or web address] message with results  VBI-   BMI Counseling: Body mass index is 30.64 kg/m². The BMI is above normal. Nutrition recommendations include reducing portion sizes and 3-5 servings of fruits/vegetables daily. Exercise recommendations include exercising 3-5 times per week and strength training exercises.

## 2023-11-10 ENCOUNTER — ANNUAL EXAM (OUTPATIENT)
Dept: OBGYN CLINIC | Facility: MEDICAL CENTER | Age: 68
End: 2023-11-10
Payer: COMMERCIAL

## 2023-11-10 VITALS
BODY MASS INDEX: 30.82 KG/M2 | HEIGHT: 62 IN | SYSTOLIC BLOOD PRESSURE: 130 MMHG | WEIGHT: 167.5 LBS | DIASTOLIC BLOOD PRESSURE: 80 MMHG

## 2023-11-10 DIAGNOSIS — Z01.419 WELL WOMAN EXAM WITH ROUTINE GYNECOLOGICAL EXAM: Primary | ICD-10-CM

## 2023-11-10 DIAGNOSIS — Z78.0 POSTMENOPAUSAL ESTROGEN DEFICIENCY: ICD-10-CM

## 2023-11-10 DIAGNOSIS — Z13.820 SCREENING FOR OSTEOPOROSIS: ICD-10-CM

## 2023-11-10 DIAGNOSIS — Z12.31 BREAST CANCER SCREENING BY MAMMOGRAM: ICD-10-CM

## 2023-11-10 PROCEDURE — G0101 CA SCREEN;PELVIC/BREAST EXAM: HCPCS | Performed by: NURSE PRACTITIONER

## 2023-11-10 RX ORDER — MELATONIN
1000 DAILY
COMMUNITY

## 2023-11-10 RX ORDER — CHOLECALCIFEROL (VITAMIN D3) 125 MCG
500 CAPSULE ORAL DAILY
COMMUNITY

## 2024-02-17 ENCOUNTER — APPOINTMENT (OUTPATIENT)
Dept: LAB | Facility: HOSPITAL | Age: 69
End: 2024-02-17
Payer: COMMERCIAL

## 2024-02-17 DIAGNOSIS — E03.9 ACQUIRED HYPOTHYROIDISM: ICD-10-CM

## 2024-02-17 DIAGNOSIS — E11.9 CONTROLLED TYPE 2 DIABETES MELLITUS WITHOUT COMPLICATION, WITHOUT LONG-TERM CURRENT USE OF INSULIN (HCC): ICD-10-CM

## 2024-02-17 DIAGNOSIS — E78.5 DYSLIPIDEMIA: ICD-10-CM

## 2024-02-17 LAB
ALBUMIN SERPL BCP-MCNC: 4.2 G/DL (ref 3.5–5)
ALP SERPL-CCNC: 75 U/L (ref 34–104)
ALT SERPL W P-5'-P-CCNC: 59 U/L (ref 7–52)
ANION GAP SERPL CALCULATED.3IONS-SCNC: 5 MMOL/L
AST SERPL W P-5'-P-CCNC: 42 U/L (ref 13–39)
BILIRUB SERPL-MCNC: 0.43 MG/DL (ref 0.2–1)
BUN SERPL-MCNC: 18 MG/DL (ref 5–25)
CALCIUM SERPL-MCNC: 9.1 MG/DL (ref 8.4–10.2)
CHLORIDE SERPL-SCNC: 103 MMOL/L (ref 96–108)
CHOLEST SERPL-MCNC: 239 MG/DL
CO2 SERPL-SCNC: 29 MMOL/L (ref 21–32)
CREAT SERPL-MCNC: 1.07 MG/DL (ref 0.6–1.3)
CREAT UR-MCNC: 214.7 MG/DL
ERYTHROCYTE [DISTWIDTH] IN BLOOD BY AUTOMATED COUNT: 15.7 % (ref 11.6–15.1)
EST. AVERAGE GLUCOSE BLD GHB EST-MCNC: 148 MG/DL
GFR SERPL CREATININE-BSD FRML MDRD: 53 ML/MIN/1.73SQ M
GLUCOSE P FAST SERPL-MCNC: 98 MG/DL (ref 65–99)
HBA1C MFR BLD: 6.8 %
HCT VFR BLD AUTO: 44.6 % (ref 34.8–46.1)
HDLC SERPL-MCNC: 54 MG/DL
HGB BLD-MCNC: 14.1 G/DL (ref 11.5–15.4)
LDLC SERPL CALC-MCNC: 154 MG/DL (ref 0–100)
MCH RBC QN AUTO: 26.9 PG (ref 26.8–34.3)
MCHC RBC AUTO-ENTMCNC: 31.6 G/DL (ref 31.4–37.4)
MCV RBC AUTO: 85 FL (ref 82–98)
MICROALBUMIN UR-MCNC: 15.7 MG/L
MICROALBUMIN/CREAT 24H UR: 7 MG/G CREATININE (ref 0–30)
PLATELET # BLD AUTO: 294 THOUSANDS/UL (ref 149–390)
PMV BLD AUTO: 11.1 FL (ref 8.9–12.7)
POTASSIUM SERPL-SCNC: 4.3 MMOL/L (ref 3.5–5.3)
PROT SERPL-MCNC: 7.3 G/DL (ref 6.4–8.4)
RBC # BLD AUTO: 5.25 MILLION/UL (ref 3.81–5.12)
SODIUM SERPL-SCNC: 137 MMOL/L (ref 135–147)
T4 FREE SERPL-MCNC: 0.45 NG/DL (ref 0.61–1.12)
TRIGL SERPL-MCNC: 156 MG/DL
TSH SERPL DL<=0.05 MIU/L-ACNC: 10.42 UIU/ML (ref 0.45–4.5)
WBC # BLD AUTO: 7.48 THOUSAND/UL (ref 4.31–10.16)

## 2024-02-17 PROCEDURE — 84439 ASSAY OF FREE THYROXINE: CPT

## 2024-02-17 PROCEDURE — 83036 HEMOGLOBIN GLYCOSYLATED A1C: CPT

## 2024-02-17 PROCEDURE — 36415 COLL VENOUS BLD VENIPUNCTURE: CPT

## 2024-02-17 PROCEDURE — 82570 ASSAY OF URINE CREATININE: CPT

## 2024-02-17 PROCEDURE — 82043 UR ALBUMIN QUANTITATIVE: CPT

## 2024-02-17 PROCEDURE — 84443 ASSAY THYROID STIM HORMONE: CPT

## 2024-02-17 PROCEDURE — 80053 COMPREHEN METABOLIC PANEL: CPT

## 2024-02-17 PROCEDURE — 85027 COMPLETE CBC AUTOMATED: CPT

## 2024-02-17 PROCEDURE — 80061 LIPID PANEL: CPT

## 2024-02-20 ENCOUNTER — OFFICE VISIT (OUTPATIENT)
Dept: FAMILY MEDICINE CLINIC | Facility: CLINIC | Age: 69
End: 2024-02-20
Payer: COMMERCIAL

## 2024-02-20 VITALS
SYSTOLIC BLOOD PRESSURE: 124 MMHG | WEIGHT: 168.8 LBS | HEIGHT: 62 IN | DIASTOLIC BLOOD PRESSURE: 82 MMHG | BODY MASS INDEX: 31.06 KG/M2

## 2024-02-20 DIAGNOSIS — E78.5 DYSLIPIDEMIA: ICD-10-CM

## 2024-02-20 DIAGNOSIS — E03.9 ACQUIRED HYPOTHYROIDISM: ICD-10-CM

## 2024-02-20 DIAGNOSIS — F41.1 GENERALIZED ANXIETY DISORDER: ICD-10-CM

## 2024-02-20 DIAGNOSIS — E11.22 CONTROLLED TYPE 2 DIABETES MELLITUS WITH STAGE 3 CHRONIC KIDNEY DISEASE, WITHOUT LONG-TERM CURRENT USE OF INSULIN (HCC): ICD-10-CM

## 2024-02-20 DIAGNOSIS — Z11.59 ENCOUNTER FOR HEPATITIS C SCREENING TEST FOR LOW RISK PATIENT: ICD-10-CM

## 2024-02-20 DIAGNOSIS — Z12.31 ENCOUNTER FOR SCREENING MAMMOGRAM FOR BREAST CANCER: ICD-10-CM

## 2024-02-20 DIAGNOSIS — N18.30 CONTROLLED TYPE 2 DIABETES MELLITUS WITH STAGE 3 CHRONIC KIDNEY DISEASE, WITHOUT LONG-TERM CURRENT USE OF INSULIN (HCC): ICD-10-CM

## 2024-02-20 DIAGNOSIS — E55.9 VITAMIN D DEFICIENCY: ICD-10-CM

## 2024-02-20 DIAGNOSIS — M79.7 FIBROMYALGIA: ICD-10-CM

## 2024-02-20 DIAGNOSIS — Z00.00 MEDICARE ANNUAL WELLNESS VISIT, SUBSEQUENT: Primary | ICD-10-CM

## 2024-02-20 DIAGNOSIS — N18.31 STAGE 3A CHRONIC KIDNEY DISEASE (HCC): ICD-10-CM

## 2024-02-20 PROBLEM — E66.09 CLASS 1 OBESITY DUE TO EXCESS CALORIES WITH SERIOUS COMORBIDITY AND BODY MASS INDEX (BMI) OF 30.0 TO 30.9 IN ADULT: Status: ACTIVE | Noted: 2024-02-20

## 2024-02-20 PROBLEM — E66.811 CLASS 1 OBESITY DUE TO EXCESS CALORIES WITH SERIOUS COMORBIDITY AND BODY MASS INDEX (BMI) OF 30.0 TO 30.9 IN ADULT: Status: ACTIVE | Noted: 2024-02-20

## 2024-02-20 PROCEDURE — 99214 OFFICE O/P EST MOD 30 MIN: CPT | Performed by: FAMILY MEDICINE

## 2024-02-20 PROCEDURE — G0439 PPPS, SUBSEQ VISIT: HCPCS | Performed by: FAMILY MEDICINE

## 2024-02-20 RX ORDER — LEVOTHYROXINE SODIUM 0.15 MG/1
150 TABLET ORAL
Qty: 90 TABLET | Refills: 0 | Status: SHIPPED | OUTPATIENT
Start: 2024-02-20

## 2024-02-20 NOTE — ASSESSMENT & PLAN NOTE
Hep c antibody and mammogram ordered Dexa is schedulled patient up to date with pap and colon cancer screening Patient shots are up to date She will get me a copy of living will Patient recommended to follow low carb diet and also start to exercise Exercise goal is 30-45 minutes 5-7 days per week

## 2024-02-20 NOTE — ASSESSMENT & PLAN NOTE
Lab Results   Component Value Date    EGFR 53 02/17/2024    EGFR 56 06/17/2023    EGFR 63 12/10/2022    CREATININE 1.07 02/17/2024    CREATININE 1.03 06/17/2023    CREATININE 0.95 12/10/2022   Labs reviewd with her Patient to avoid NSAID's keep good control of blood pressure and sugars She also needs to stay hydrated 48-64 oz water daily

## 2024-02-20 NOTE — ASSESSMENT & PLAN NOTE
LDL is high  153 on 2/17/2024 labs Patient however is also hypothyroid Will adjust thyroid meds Patient to also watch diet and start exercise If still elevated in 6 months will start statin

## 2024-02-20 NOTE — PATIENT INSTRUCTIONS
Medicare Preventive Visit Patient Instructions  Thank you for completing your Welcome to Medicare Visit or Medicare Annual Wellness Visit today. Your next wellness visit will be due in one year (2/20/2025).  The screening/preventive services that you may require over the next 5-10 years are detailed below. Some tests may not apply to you based off risk factors and/or age. Screening tests ordered at today's visit but not completed yet may show as past due. Also, please note that scanned in results may not display below.  Preventive Screenings:  Service Recommendations Previous Testing/Comments   Colorectal Cancer Screening  * Colonoscopy    * Fecal Occult Blood Test (FOBT)/Fecal Immunochemical Test (FIT)  * Fecal DNA/Cologuard Test  * Flexible Sigmoidoscopy Age: 45-75 years old   Colonoscopy: every 10 years (may be performed more frequently if at higher risk)  OR  FOBT/FIT: every 1 year  OR  Cologuard: every 3 years  OR  Sigmoidoscopy: every 5 years  Screening may be recommended earlier than age 45 if at higher risk for colorectal cancer. Also, an individualized decision between you and your healthcare provider will decide whether screening between the ages of 76-85 would be appropriate. Colonoscopy: 12/28/2021  FOBT/FIT: 12/21/2021  Cologuard: 12/21/2021  Sigmoidoscopy: 12/21/2021    Screening Current     Breast Cancer Screening Age: 40+ years old  Frequency: every 1-2 years  Not required if history of left and right mastectomy Mammogram: 11/29/2022    Screening Current   Cervical Cancer Screening Between the ages of 21-29, pap smear recommended once every 3 years.   Between the ages of 30-65, can perform pap smear with HPV co-testing every 5 years.   Recommendations may differ for women with a history of total hysterectomy, cervical cancer, or abnormal pap smears in past. Pap Smear: 11/10/2023    Screening Not Indicated   Hepatitis C Screening Once for adults born between 1945 and 1965  More frequently in patients  at high risk for Hepatitis C Hep C Antibody: Not on file        Diabetes Screening 1-2 times per year if you're at risk for diabetes or have pre-diabetes Fasting glucose: 98 mg/dL (2/17/2024)  A1C: 6.8 % (2/17/2024)  Screening Not Indicated  History Diabetes   Cholesterol Screening Once every 5 years if you don't have a lipid disorder. May order more often based on risk factors. Lipid panel: 02/17/2024    Screening Current     Other Preventive Screenings Covered by Medicare:  Abdominal Aortic Aneurysm (AAA) Screening: covered once if your at risk. You're considered to be at risk if you have a family history of AAA.  Lung Cancer Screening: covers low dose CT scan once per year if you meet all of the following conditions: (1) Age 55-77; (2) No signs or symptoms of lung cancer; (3) Current smoker or have quit smoking within the last 15 years; (4) You have a tobacco smoking history of at least 20 pack years (packs per day multiplied by number of years you smoked); (5) You get a written order from a healthcare provider.  Glaucoma Screening: covered annually if you're considered high risk: (1) You have diabetes OR (2) Family history of glaucoma OR (3)  aged 50 and older OR (4)  American aged 65 and older  Osteoporosis Screening: covered every 2 years if you meet one of the following conditions: (1) You're estrogen deficient and at risk for osteoporosis based off medical history and other findings; (2) Have a vertebral abnormality; (3) On glucocorticoid therapy for more than 3 months; (4) Have primary hyperparathyroidism; (5) On osteoporosis medications and need to assess response to drug therapy.   Last bone density test (DXA Scan): 12/22/2021.  HIV Screening: covered annually if you're between the age of 15-65. Also covered annually if you are younger than 15 and older than 65 with risk factors for HIV infection. For pregnant patients, it is covered up to 3 times per  pregnancy.    Immunizations:  Immunization Recommendations   Influenza Vaccine Annual influenza vaccination during flu season is recommended for all persons aged >= 6 months who do not have contraindications   Pneumococcal Vaccine   * Pneumococcal conjugate vaccine = PCV13 (Prevnar 13), PCV15 (Vaxneuvance), PCV20 (Prevnar 20)  * Pneumococcal polysaccharide vaccine = PPSV23 (Pneumovax) Adults 19-65 yo with certain risk factors or if 65+ yo  If never received any pneumonia vaccine: recommend Prevnar 20 (PCV20)  Give PCV20 if previously received 1 dose of PCV13 or PPSV23   Hepatitis B Vaccine 3 dose series if at intermediate or high risk (ex: diabetes, end stage renal disease, liver disease)   Respiratory syncytial virus (RSV) Vaccine - COVERED BY MEDICARE PART D  * RSVPreF3 (Arexvy) CDC recommends that adults 60 years of age and older may receive a single dose of RSV vaccine using shared clinical decision-making (SCDM)   Tetanus (Td) Vaccine - COST NOT COVERED BY MEDICARE PART B Following completion of primary series, a booster dose should be given every 10 years to maintain immunity against tetanus. Td may also be given as tetanus wound prophylaxis.   Tdap Vaccine - COST NOT COVERED BY MEDICARE PART B Recommended at least once for all adults. For pregnant patients, recommended with each pregnancy.   Shingles Vaccine (Shingrix) - COST NOT COVERED BY MEDICARE PART B  2 shot series recommended in those 19 years and older who have or will have weakened immune systems or those 50 years and older     Health Maintenance Due:      Topic Date Due   • Hepatitis C Screening  Never done   • Breast Cancer Screening: Mammogram  11/29/2023   • Colorectal Cancer Screening  12/28/2031     Immunizations Due:      Topic Date Due   • COVID-19 Vaccine (4 - 2023-24 season) 09/01/2023   • Pneumococcal Vaccine: 65+ Years (3 of 3 - PPSV23 or PCV20) 01/08/2024     Advance Directives   What are advance directives?  Advance directives are legal  documents that state your wishes and plans for medical care. These plans are made ahead of time in case you lose your ability to make decisions for yourself. Advance directives can apply to any medical decision, such as the treatments you want, and if you want to donate organs.   What are the types of advance directives?  There are many types of advance directives, and each state has rules about how to use them. You may choose a combination of any of the following:  Living will:  This is a written record of the treatment you want. You can also choose which treatments you do not want, which to limit, and which to stop at a certain time. This includes surgery, medicine, IV fluid, and tube feedings.   Durable power of  for healthcare (DPAHC):  This is a written record that states who you want to make healthcare choices for you when you are unable to make them for yourself. This person, called a proxy, is usually a family member or a friend. You may choose more than 1 proxy.  Do not resuscitate (DNR) order:  A DNR order is used in case your heart stops beating or you stop breathing. It is a request not to have certain forms of treatment, such as CPR. A DNR order may be included in other types of advance directives.  Medical directive:  This covers the care that you want if you are in a coma, near death, or unable to make decisions for yourself. You can list the treatments you want for each condition. Treatment may include pain medicine, surgery, blood transfusions, dialysis, IV or tube feedings, and a ventilator (breathing machine).  Values history:  This document has questions about your views, beliefs, and how you feel and think about life. This information can help others choose the care that you would choose.  Why are advance directives important?  An advance directive helps you control your care. Although spoken wishes may be used, it is better to have your wishes written down. Spoken wishes can be  misunderstood, or not followed. Treatments may be given even if you do not want them. An advance directive may make it easier for your family to make difficult choices about your care.   Weight Management   Why it is important to manage your weight:  Being overweight increases your risk of health conditions such as heart disease, high blood pressure, type 2 diabetes, and certain types of cancer. It can also increase your risk for osteoarthritis, sleep apnea, and other respiratory problems. Aim for a slow, steady weight loss. Even a small amount of weight loss can lower your risk of health problems.  How to lose weight safely:  A safe and healthy way to lose weight is to eat fewer calories and get regular exercise. You can lose up about 1 pound a week by decreasing the number of calories you eat by 500 calories each day.   Healthy meal plan for weight management:  A healthy meal plan includes a variety of foods, contains fewer calories, and helps you stay healthy. A healthy meal plan includes the following:  Eat whole-grain foods more often.  A healthy meal plan should contain fiber. Fiber is the part of grains, fruits, and vegetables that is not broken down by your body. Whole-grain foods are healthy and provide extra fiber in your diet. Some examples of whole-grain foods are whole-wheat breads and pastas, oatmeal, brown rice, and bulgur.  Eat a variety of vegetables every day.  Include dark, leafy greens such as spinach, kale, libby greens, and mustard greens. Eat yellow and orange vegetables such as carrots, sweet potatoes, and winter squash.   Eat a variety of fruits every day.  Choose fresh or canned fruit (canned in its own juice or light syrup) instead of juice. Fruit juice has very little or no fiber.  Eat low-fat dairy foods.  Drink fat-free (skim) milk or 1% milk. Eat fat-free yogurt and low-fat cottage cheese. Try low-fat cheeses such as mozzarella and other reduced-fat cheeses.  Choose meat and other  protein foods that are low in fat.  Choose beans or other legumes such as split peas or lentils. Choose fish, skinless poultry (chicken or turkey), or lean cuts of red meat (beef or pork). Before you cook meat or poultry, cut off any visible fat.   Use less fat and oil.  Try baking foods instead of frying them. Add less fat, such as margarine, sour cream, regular salad dressing and mayonnaise to foods. Eat fewer high-fat foods. Some examples of high-fat foods include french fries, doughnuts, ice cream, and cakes.  Eat fewer sweets.  Limit foods and drinks that are high in sugar. This includes candy, cookies, regular soda, and sweetened drinks.  Exercise:  Exercise at least 30 minutes per day on most days of the week. Some examples of exercise include walking, biking, dancing, and swimming. You can also fit in more physical activity by taking the stairs instead of the elevator or parking farther away from stores. Ask your healthcare provider about the best exercise plan for you.      © Copyright [employer] 2018 Information is for End User's use only and may not be sold, redistributed or otherwise used for commercial purposes. All illustrations and images included in CareNotes® are the copyrighted property of [employer] or [employer]

## 2024-02-20 NOTE — ASSESSMENT & PLAN NOTE
Stable per patient symptoms Continue to see her rheumatology team Continue with cymbalta See me in 6 months

## 2024-02-20 NOTE — PROGRESS NOTES
Assessment and Plan:     Problem List Items Addressed This Visit          Endocrine    Controlled type 2 diabetes mellitus with stage 3 chronic kidney disease, without long-term current use of insulin (HCC)     Patieint sugars are up a bit A1c was 6.2 now 6.8 Patient will continue to watch diet and exercise Goal of A1c < 7 was reviewed as well as cholesterol goal of LDL<80 was reivewed repeat labs and follow up in 6 months  Lab Results   Component Value Date    HGBA1C 6.8 (H) 02/17/2024            Relevant Orders    Hemoglobin A1C    Comprehensive metabolic panel    Acquired hypothyroidism     TSH on 2/17/2024 was >10 I have increased the thyroid medication to 150 mcg daily Patient to have repeat labs in 8 weeks I discussed also the proper way to take thyroid medication I will see her in 6 months          Relevant Medications    levothyroxine (Euthyrox) 150 mcg tablet    Other Relevant Orders    TSH, 3rd generation with Free T4 reflex    TSH, 3rd generation with Free T4 reflex       Genitourinary    Stage 3a chronic kidney disease (HCC)     Lab Results   Component Value Date    EGFR 53 02/17/2024    EGFR 56 06/17/2023    EGFR 63 12/10/2022    CREATININE 1.07 02/17/2024    CREATININE 1.03 06/17/2023    CREATININE 0.95 12/10/2022   Labs reviewd with her Patient to avoid NSAID's keep good control of blood pressure and sugars She also needs to stay hydrated 48-64 oz water daily         Relevant Orders    Comprehensive metabolic panel       Other    Dyslipidemia     LDL is high  153 on 2/17/2024 labs Patient however is also hypothyroid Will adjust thyroid meds Patient to also watch diet and start exercise If still elevated in 6 months will start statin         Relevant Orders    Comprehensive metabolic panel    Lipid panel    Generalized anxiety disorder     Stable continue with cymbalta and see me in 6 months         Fibromyalgia     Stable per patient symptoms Continue to see her rheumatology team Continue with  cymbalta See me in 6 months         Vitamin D deficiency     Labs from 2/17/2024 was reviewed with her and is stable continue with vitamin D supplement and have dexa scan done         Relevant Orders    Vitamin D 25 hydroxy    Medicare annual wellness visit, subsequent - Primary     Hep c antibody and mammogram ordered Dexa is schedulled patient up to date with pap and colon cancer screening Patient shots are up to date She will get me a copy of living will Patient recommended to follow low carb diet and also start to exercise Exercise goal is 30-45 minutes 5-7 days per week           Other Visit Diagnoses       Encounter for screening mammogram for breast cancer        Relevant Orders    Mammo screening bilateral w 3d & cad    Encounter for hepatitis C screening test for low risk patient        Relevant Orders    Hepatitis C antibody             Preventive health issues were discussed with patient, and age appropriate screening tests were ordered as noted in patient's After Visit Summary.  Personalized health advice and appropriate referrals for health education or preventive services given if needed, as noted in patient's After Visit Summary.     History of Present Illness:     Patient presents for a Medicare Wellness Visit    Patient is here for medicare wellness and followup of diabetes with stage 3 renal disease hypothyroidism fibromyalgia vitamin D deficiency anxiety and her weight Patient has not been exercising However her weight is stable Patient det is fairly stable Patient has had her labs done Her thyroid numbers lipids and sugars are off Patient is taking thyeoid meds daily However she sometimes does not take on an empty stomach She is up to date with eye exam Patient ahs no complaints today and feels well over all She has some fatigue issues due to the fibromyalgia        Patient Care Team:  Venkatesh Mcclendon DO as PCP - [facility] ([facility])  DO Adriana Asif,  MD Karin Melchor MD     Review of Systems:     Review of Systems   Constitutional:  Negative for fatigue, fever and unexpected weight change.   HENT:  Negative for congestion, sinus pain and trouble swallowing.    Eyes:  Negative for discharge and visual disturbance.   Respiratory:  Negative for cough, chest tightness, shortness of breath and wheezing.    Cardiovascular:  Negative for chest pain, palpitations and leg swelling.   Gastrointestinal:  Negative for abdominal pain, blood in stool, constipation, diarrhea, nausea and vomiting.   Genitourinary:  Negative for difficulty urinating, dysuria, frequency and hematuria.   Musculoskeletal:  Negative for arthralgias, gait problem and joint swelling.   Skin:  Negative for rash and wound.   Allergic/Immunologic: Negative for environmental allergies and food allergies.   Neurological:  Negative for dizziness, syncope, weakness, numbness and headaches.   Hematological:  Negative for adenopathy. Does not bruise/bleed easily.   Psychiatric/Behavioral:  Negative for confusion, decreased concentration and sleep disturbance. The patient is not nervous/anxious.         Problem List:     Patient Active Problem List   Diagnosis    Controlled type 2 diabetes mellitus with stage 3 chronic kidney disease, without long-term current use of insulin (HCC)    Dyslipidemia    Generalized anxiety disorder    Acquired hypothyroidism    Fibromyalgia    Polyarthropathy    Chronic right shoulder pain    Vaginal atrophy    Vitamin D deficiency    Stage 3a chronic kidney disease (HCC)    Medicare annual wellness visit, subsequent    Class 1 obesity due to excess calories with serious comorbidity and body mass index (BMI) of 30.0 to 30.9 in adult      Past Medical and Surgical History:     Past Medical History:   Diagnosis Date    Cervical somatic dysfunction     last assessed 3/3/2017    Diabetes mellitus (HCC)     last assessed 6/11/2017    Disease of thyroid gland     Ear problems      Endometrial polyp     last assessed 11/10/2014    Fibroids, submucosal 2015    [id number] anteriorly by [facility]    Fibromyalgia     Herpes simplex     Hypercholesterolemia     resolved 2015    Nasal congestion     Postmenopausal bleeding     last assessed 11/10/2014, endometrial polyp, D&C 10/14    Sleep disturbance     last assessed 3/5/2012    Tinnitus     Varicella      Past Surgical History:   Procedure Laterality Date    BREAST SURGERY Bilateral     2014.reduction procedure     SECTION  1976    fetal intolerance    COLONOSCOPY      resolved 2012, Dr. Ann - normal repeat in     DILATION AND CURETTAGE OF UTERUS  2014    PMB, polyp path;  endometrial polyp with focal tubular hyperplasia, benign, inactive endometrial curettings    HYSTEROSCOPY  2014    path; benign    TUBAL LIGATION Bilateral       Family History:     Family History   Problem Relation Age of Onset    Coronary artery disease Mother     Diabetes Mother         DM    Hyperlipidemia Mother         hypercholesterolemia    No Known Problems Sister     No Known Problems Sister     No Known Problems Sister     No Known Problems Sister     Diabetes Brother     Heart Valve Disease Brother     No Known Problems Son     No Known Problems Maternal Grandmother     No Known Problems Maternal Grandfather     No Known Problems Paternal Grandmother     No Known Problems Paternal Grandfather     Breast cancer Neg Hx     Colon cancer Neg Hx     Ovarian cancer Neg Hx       Social History:     Social History     Socioeconomic History    Marital status: /Civil Union     Spouse name: None    Number of children: None    Years of education: None    Highest education level: None   Occupational History    Occupation: medical assistant   Tobacco Use    Smoking status: Never    Smokeless tobacco: Never   Vaping Use    Vaping status: Never Used   Substance and Sexual Activity    Alcohol use: No     Comment: history    Drug use: No     Sexual activity: Yes     Partners: Male     Birth control/protection: Post-menopausal   Other Topics Concern    None   Social History Narrative    Caffeine use- 1 1/2 cups of coffee    Inadequate exercise    Uses safety equipment - seatbelts     Social Determinants of Health     Financial Resource Strain: Low Risk  (2/20/2024)    Overall Financial Resource Strain (CARDIA)     Difficulty of Paying Living Expenses: Not hard at all   Food Insecurity: Not on file   Transportation Needs: No Transportation Needs (2/20/2024)    PRAPARE - Transportation     Lack of Transportation (Medical): No     Lack of Transportation (Non-Medical): No   Physical Activity: Not on file   Stress: Not on file   Social Connections: Not on file   Intimate Partner Violence: Not on file   Housing Stability: Not on file      Medications and Allergies:     Current Outpatient Medications   Medication Sig Dispense Refill    cholecalciferol (VITAMIN D3) 1,000 units tablet Take 1,000 Units by mouth daily      DULoxetine (CYMBALTA) 60 mg delayed release capsule TAKE 1 CAPSULE BY MOUTH DAILY 90 capsule 3    fluticasone (FLONASE) 50 mcg/act nasal spray 1 spray into each nostril 2 (two) times a day 16 g 3    levothyroxine (Euthyrox) 150 mcg tablet Take 1 tablet (150 mcg total) by mouth daily in the early morning 90 tablet 0    omeprazole (PriLOSEC) 40 MG capsule 1 tab po 30-60 minutes po before breakfast 90 capsule 1    Specialty Vitamins Products (magnesium, amino acid chelate,) 133 MG tablet Take 133 mg by mouth daily      vitamin B-12 (VITAMIN B-12) 500 mcg tablet Take 500 mcg by mouth daily       No current facility-administered medications for this visit.     Allergies   Allergen Reactions    No Active Allergies       Immunizations:     Immunization History   Administered Date(s) Administered    COVID-19 PFIZER VACCINE 0.3 ML IM 12/31/2020, 01/21/2021, 11/12/2021    Hep B, adult 04/29/2005, 06/10/2005, 11/11/2005    INFLUENZA 10/18/2021, 10/10/2022,  11/11/2023    Influenza Quadrivalent Preservative Free 3 years and older IM 10/04/2016    Influenza, seasonal, injectable 09/10/2015    Pneumococcal Conjugate 13-Valent 10/20/2020    Pneumococcal Polysaccharide PPV23 01/08/2019    Tdap 07/13/2020      Health Maintenance:         Topic Date Due    Hepatitis C Screening  Never done    Breast Cancer Screening: Mammogram  11/29/2023    Colorectal Cancer Screening  12/28/2031         Topic Date Due    COVID-19 Vaccine (4 - 2023-24 season) 09/01/2023    Pneumococcal Vaccine: 65+ Years (3 of 3 - PPSV23 or PCV20) 01/08/2024      Medicare Screening Tests and Risk Assessments:     Gela is here for her Subsequent Wellness visit.     Health Risk Assessment:   Patient rates overall health as very good. Patient feels that their physical health rating is same. Patient is satisfied with their life. Eyesight was rated as same. Hearing was rated as same. Patient feels that their emotional and mental health rating is same. Patients states they are never, rarely angry. Patient states they are sometimes unusually tired/fatigued. Pain experienced in the last 7 days has been none. Patient states that she has experienced no weight loss or gain in last 6 months.     Depression Screening:   PHQ-2 Score: 1      Fall Risk Screening:   In the past year, patient has experienced: no history of falling in past year      Urinary Incontinence Screening:   Patient has not leaked urine accidently in the last six months.     Home Safety:  Patient does not have trouble with stairs inside or outside of their home. Patient has working smoke alarms and has working carbon monoxide detector. Home safety hazards include: none.     Nutrition:   Current diet is Regular.     Medications:   Patient is currently taking over-the-counter supplements. OTC medications include: see medication list. Patient is able to manage medications.     Activities of Daily Living (ADLs)/Instrumental Activities of Daily Living  "(IADLs):   Walk and transfer into and out of bed and chair?: Yes  Dress and groom yourself?: Yes    Bathe or shower yourself?: Yes    Feed yourself? Yes  Do your laundry/housekeeping?: Yes  Manage your money, pay your bills and track your expenses?: Yes  Make your own meals?: Yes    Do your own shopping?: Yes    Previous Hospitalizations:   Any hospitalizations or ED visits within the last 12 months?: No      Advance Care Planning:   Living will: Yes    Durable POA for healthcare: Yes    Advanced directive: Yes      Cognitive Screening:   Provider or family/friend/caregiver concerned regarding cognition?: No    PREVENTIVE SCREENINGS      Cardiovascular Screening:    General: Screening Current      Diabetes Screening:     General: Screening Not Indicated and History Diabetes      Colorectal Cancer Screening:     General: Screening Current      Breast Cancer Screening:     General: Screening Current      Cervical Cancer Screening:    General: Screening Not Indicated      Osteoporosis Screening:    General: Screening Not Indicated      Abdominal Aortic Aneurysm (AAA) Screening:        General: Screening Not Indicated      Lung Cancer Screening:     General: Screening Not Indicated    Screening, Brief Intervention, and Referral to Treatment (SBIRT)    Screening  Typical number of drinks in a day: 0  Typical number of drinks in a week: 0  Interpretation: Low risk drinking behavior.    Single Item Drug Screening:  How often have you used an illegal drug (including marijuana) or a prescription medication for non-medical reasons in the past year? never    Single Item Drug Screen Score: 0  Interpretation: Negative screen for possible drug use disorder    No results found.     Physical Exam:     /82   Ht 5' 2\" (1.575 m)   Wt 76.6 kg (168 lb 12.8 oz)   BMI 30.87 kg/m²     Physical Exam  Vitals and nursing note reviewed.   Constitutional:       Appearance: She is well-developed. She is obese.   HENT:      Head: " Normocephalic and atraumatic.      Right Ear: Tympanic membrane and external ear normal.      Left Ear: Tympanic membrane and external ear normal.      Nose: Nose normal.      Mouth/Throat:      Pharynx: No oropharyngeal exudate.   Eyes:      Extraocular Movements: Extraocular movements intact.      Conjunctiva/sclera: Conjunctivae normal.      Pupils: Pupils are equal, round, and reactive to light.   Neck:      Thyroid: No thyromegaly.      Trachea: No tracheal deviation.   Cardiovascular:      Rate and Rhythm: Normal rate and regular rhythm.      Heart sounds: Normal heart sounds.   Pulmonary:      Effort: Pulmonary effort is normal. No respiratory distress.      Breath sounds: Normal breath sounds. No wheezing or rales.   Abdominal:      General: Bowel sounds are normal.      Palpations: Abdomen is soft.   Musculoskeletal:         General: Normal range of motion.      Cervical back: Normal range of motion and neck supple.   Lymphadenopathy:      Cervical: No cervical adenopathy.   Skin:     General: Skin is warm.      Findings: No rash.   Neurological:      General: No focal deficit present.      Mental Status: She is alert and oriented to person, place, and time.      Cranial Nerves: No cranial nerve deficit.      Motor: No abnormal muscle tone.   Psychiatric:         Mood and Affect: Mood normal.         Behavior: Behavior normal.         Thought Content: Thought content normal.         Judgment: Judgment normal.          Yanet Rhodes DO

## 2024-02-20 NOTE — ASSESSMENT & PLAN NOTE
Patieint sugars are up a bit A1c was 6.2 now 6.8 Patient will continue to watch diet and exercise Goal of A1c < 7 was reviewed as well as cholesterol goal of LDL<80 was reivewed repeat labs and follow up in 6 months  Lab Results   Component Value Date    HGBA1C 6.8 (H) 02/17/2024

## 2024-02-20 NOTE — ASSESSMENT & PLAN NOTE
Labs from 2/17/2024 was reviewed with her and is stable continue with vitamin D supplement and have dexa scan done

## 2024-02-21 PROBLEM — Z00.00 MEDICARE ANNUAL WELLNESS VISIT, SUBSEQUENT: Status: RESOLVED | Noted: 2024-02-20 | Resolved: 2024-02-21

## 2024-03-05 DIAGNOSIS — E03.9 ACQUIRED HYPOTHYROIDISM: ICD-10-CM

## 2024-03-05 RX ORDER — LEVOTHYROXINE SODIUM 0.15 MG/1
TABLET ORAL
Qty: 90 TABLET | Refills: 1 | Status: SHIPPED | OUTPATIENT
Start: 2024-03-05

## 2024-04-12 ENCOUNTER — APPOINTMENT (OUTPATIENT)
Dept: LAB | Facility: HOSPITAL | Age: 69
End: 2024-04-12
Payer: COMMERCIAL

## 2024-04-12 DIAGNOSIS — E03.9 HYPOTHYROIDISM, UNSPECIFIED TYPE: ICD-10-CM

## 2024-04-12 DIAGNOSIS — E03.9 ACQUIRED HYPOTHYROIDISM: ICD-10-CM

## 2024-04-12 LAB
T4 FREE SERPL-MCNC: 1.22 NG/DL (ref 0.61–1.12)
TSH SERPL DL<=0.05 MIU/L-ACNC: <0.01 UIU/ML (ref 0.45–4.5)

## 2024-04-12 PROCEDURE — 84443 ASSAY THYROID STIM HORMONE: CPT

## 2024-04-12 PROCEDURE — 36415 COLL VENOUS BLD VENIPUNCTURE: CPT

## 2024-04-12 PROCEDURE — 84439 ASSAY OF FREE THYROXINE: CPT

## 2024-04-15 ENCOUNTER — TELEPHONE (OUTPATIENT)
Age: 69
End: 2024-04-15

## 2024-04-15 DIAGNOSIS — E03.9 ACQUIRED HYPOTHYROIDISM: Primary | ICD-10-CM

## 2024-04-15 RX ORDER — LEVOTHYROXINE SODIUM 0.12 MG/1
TABLET ORAL
Qty: 45 TABLET | Refills: 0 | Status: SHIPPED | OUTPATIENT
Start: 2024-04-15 | End: 2024-04-16

## 2024-04-15 NOTE — TELEPHONE ENCOUNTER
Patient called in regarding lab results. Advised results per PCP notes. Patient verbalized understanding. Patient states she does not have 125mg tablets. Will need called in to pharmacy. Please advise.

## 2024-04-15 NOTE — TELEPHONE ENCOUNTER
Patient stated she was returning phone call back to review her lab results. Warm transferred to [facility].

## 2024-04-16 DIAGNOSIS — Z79.899 MEDICATION DOSE CHANGED: ICD-10-CM

## 2024-04-16 DIAGNOSIS — E03.9 ACQUIRED HYPOTHYROIDISM: Primary | ICD-10-CM

## 2024-04-16 RX ORDER — LEVOTHYROXINE SODIUM 0.12 MG/1
125 TABLET ORAL
Qty: 90 TABLET | Refills: 0 | Status: SHIPPED | OUTPATIENT
Start: 2024-04-16

## 2024-04-16 NOTE — TELEPHONE ENCOUNTER
Patient returned call, wishing to speak to an MA in the office for further explanation.  States she is nervous about switching back and forth, and the higher dose.  Just would like someone to please call her back.  Attempted to warm transfer, however clinical staff were busy with patients at the time.

## 2024-06-04 DIAGNOSIS — E03.9 ACQUIRED HYPOTHYROIDISM: ICD-10-CM

## 2024-06-04 DIAGNOSIS — Z79.899 MEDICATION DOSE CHANGED: ICD-10-CM

## 2024-06-04 RX ORDER — LEVOTHYROXINE SODIUM 0.12 MG/1
125 TABLET ORAL
Qty: 90 TABLET | Refills: 0 | Status: SHIPPED | OUTPATIENT
Start: 2024-06-04

## 2024-06-17 ENCOUNTER — HOSPITAL ENCOUNTER (OUTPATIENT)
Dept: BONE DENSITY | Facility: CLINIC | Age: 69
Discharge: HOME | End: 2024-06-17
Payer: COMMERCIAL

## 2024-06-17 DIAGNOSIS — Z13.820 SCREENING FOR OSTEOPOROSIS: ICD-10-CM

## 2024-06-17 DIAGNOSIS — Z78.0 POSTMENOPAUSAL ESTROGEN DEFICIENCY: ICD-10-CM

## 2024-06-17 PROCEDURE — 77080 DXA BONE DENSITY AXIAL: CPT

## 2024-06-20 ENCOUNTER — TELEPHONE (OUTPATIENT)
Age: 69
End: 2024-06-20

## 2024-06-20 NOTE — TELEPHONE ENCOUNTER
----- Message from CRISTOBAL Torrez sent at 6/19/2024  8:21 AM EDT -----  Please call patient bone scan is normal.     Thank you

## 2024-06-24 NOTE — TELEPHONE ENCOUNTER
Patient returned call, advised of interpretation. Pt verbalized understanding, had no further questions. Please close out result note.

## 2024-08-05 DIAGNOSIS — E03.9 ACQUIRED HYPOTHYROIDISM: ICD-10-CM

## 2024-08-05 DIAGNOSIS — F41.1 GENERALIZED ANXIETY DISORDER: ICD-10-CM

## 2024-08-05 DIAGNOSIS — Z79.899 MEDICATION DOSE CHANGED: ICD-10-CM

## 2024-08-05 RX ORDER — DULOXETIN HYDROCHLORIDE 60 MG/1
60 CAPSULE, DELAYED RELEASE ORAL DAILY
Qty: 100 CAPSULE | Refills: 1 | Status: SHIPPED | OUTPATIENT
Start: 2024-08-05

## 2024-08-05 RX ORDER — LEVOTHYROXINE SODIUM 0.12 MG/1
TABLET ORAL
Qty: 100 TABLET | Refills: 1 | Status: SHIPPED | OUTPATIENT
Start: 2024-08-05

## 2024-08-09 ENCOUNTER — APPOINTMENT (OUTPATIENT)
Dept: RADIOLOGY | Facility: MEDICAL CENTER | Age: 69
End: 2024-08-09
Payer: COMMERCIAL

## 2024-08-09 ENCOUNTER — OFFICE VISIT (OUTPATIENT)
Dept: OBGYN CLINIC | Facility: MEDICAL CENTER | Age: 69
End: 2024-08-09
Payer: COMMERCIAL

## 2024-08-09 VITALS
BODY MASS INDEX: 30.18 KG/M2 | HEIGHT: 62 IN | HEART RATE: 80 BPM | SYSTOLIC BLOOD PRESSURE: 150 MMHG | DIASTOLIC BLOOD PRESSURE: 82 MMHG | RESPIRATION RATE: 18 BRPM | WEIGHT: 164 LBS

## 2024-08-09 DIAGNOSIS — M25.362 INSTABILITY OF BOTH KNEE JOINTS: ICD-10-CM

## 2024-08-09 DIAGNOSIS — M25.561 PAIN IN BOTH KNEES, UNSPECIFIED CHRONICITY: ICD-10-CM

## 2024-08-09 DIAGNOSIS — M17.0 BILATERAL PRIMARY OSTEOARTHRITIS OF KNEE: Primary | ICD-10-CM

## 2024-08-09 DIAGNOSIS — M25.361 INSTABILITY OF BOTH KNEE JOINTS: ICD-10-CM

## 2024-08-09 DIAGNOSIS — M25.562 PAIN IN BOTH KNEES, UNSPECIFIED CHRONICITY: ICD-10-CM

## 2024-08-09 PROCEDURE — 73564 X-RAY EXAM KNEE 4 OR MORE: CPT

## 2024-08-09 PROCEDURE — 99204 OFFICE O/P NEW MOD 45 MIN: CPT | Performed by: ORTHOPAEDIC SURGERY

## 2024-08-09 NOTE — PROGRESS NOTES
Assessment & Plan     1. Bilateral primary osteoarthritis of knee    2. Instability of both knee joints      Orders Placed This Encounter   Procedures    Brace    XR knee 4+ vw left injury    XR knee 4+ vw right injury    Ambulatory Referral to Physical Therapy       The patient has moderate to severe left knee osteoarthritis and moderate to severe right knee patellofemoral osteoarthritis and moderate right knee osteoarthritis elsewhere. We discussed treatment options as well as risks and benefits of treatment options.  Physical therapy referral was given patient at today's visit.  Continue topical gels, ice and heat as needed for pain control.  Patient may take OTC medications as needed for pain control.  Bilateral short hinged knee brace was given to patient at today's visit.  Patient may continue activity as tolerated.  Patient declined injections today.  Discussed with patient if her symptoms worsen she may consider steroid injections in the future.    Return if symptoms worsen or fail to improve.    I answered all of the patient's questions during the visit and provided education of the patient's condition during the visit.  The patient verbalized understanding of the information given and agrees with the plan.  This note was dictated using [employer] software.  It may contain errors including improperly dictated words.  Please contact physician directly for any questions.    History of Present Illness   Chief complaint:   Chief Complaint   Patient presents with    Left Knee - Pain    Right Knee - Pain       HPI: Gela Chamorro is a 68 y.o. female that c/o bilateral knee pain.    Length of time knee pain has been present: 2-3 months  Any falls or trauma associated with onset of pain: no robyn   Location of pain: generalized  Intermittent or constant: intermittent  Description of pain: achy dull   Aggravating factors: walking, bending, squatting, kneeling, steps  Instability or locking: Reports instability  Pain  medication that has been tried: aleve as needed  Topical mediation that has been tried: yes  Has heat/ice/elevation been tried: yes  Can NSAIDs be taken?  If not why?: yes  Has PT or home exercises been tried?: no but goes for back  Has bracing been tried? OTC or rx?  Knee brace  Have injections been tried?  Steroid/visco?: no  Any history of surgery on that knee?:  no      ROS:    See HPI for musculoskeletal review.   All other systems reviewed are negative     Historical Information   Past Medical History:   Diagnosis Date    Cervical somatic dysfunction     last assessed 3/3/2017    Diabetes mellitus (HCC)     last assessed 2017    Disease of thyroid gland     Ear problems     Endometrial polyp     last assessed 11/10/2014    Fibroids, submucosal 2015    [id number] anteriorly by USG    Fibromyalgia     Herpes simplex     Hypercholesterolemia     resolved 2015    Nasal congestion     Postmenopausal bleeding     last assessed 11/10/2014, endometrial polyp, D&C 10/14    Sleep disturbance     last assessed 3/5/2012    Tinnitus     Varicella      Past Surgical History:   Procedure Laterality Date    BREAST SURGERY Bilateral     2014.reduction procedure     SECTION      fetal intolerance    COLONOSCOPY      resolved 2012, Dr. Ann - normal repeat in     DILATION AND CURETTAGE OF UTERUS  2014    PMB, polyp path;  endometrial polyp with focal tubular hyperplasia, benign, inactive endometrial curettings    HYSTEROSCOPY  2014    path; benign    TUBAL LIGATION Bilateral      Social History   Social History     Substance and Sexual Activity   Alcohol Use No    Comment: history     Social History     Substance and Sexual Activity   Drug Use No     Social History     Tobacco Use   Smoking Status Never   Smokeless Tobacco Never     Family History:   Family History   Problem Relation Age of Onset    Coronary artery disease Mother     Diabetes Mother         DM    Hyperlipidemia  Mother         hypercholesterolemia    No Known Problems Sister     No Known Problems Sister     No Known Problems Sister     No Known Problems Sister     Diabetes Brother     Heart Valve Disease Brother     No Known Problems Son     No Known Problems Maternal Grandmother     No Known Problems Maternal Grandfather     No Known Problems Paternal Grandmother     No Known Problems Paternal Grandfather     Breast cancer Neg Hx     Colon cancer Neg Hx     Ovarian cancer Neg Hx        Current Outpatient Medications on File Prior to Visit   Medication Sig Dispense Refill    cholecalciferol (VITAMIN D3) 1,000 units tablet Take 1,000 Units by mouth daily      DULoxetine (CYMBALTA) 60 mg delayed release capsule TAKE 1 CAPSULE BY MOUTH DAILY 100 capsule 1    fluticasone (FLONASE) 50 mcg/act nasal spray 1 spray into each nostril 2 (two) times a day 16 g 3    levothyroxine 125 mcg tablet TAKE 1 TABLET BY MOUTH DAILY  EARLY IN THE MORNING (EQUIVALENT TO EUTHYROX) 100 tablet 1    omeprazole (PriLOSEC) 40 MG capsule 1 tab po 30-60 minutes po before breakfast 90 capsule 1    Specialty Vitamins Products (magnesium, amino acid chelate,) 133 MG tablet Take 133 mg by mouth daily      vitamin B-12 (VITAMIN B-12) 500 mcg tablet Take 500 mcg by mouth daily       No current facility-administered medications on file prior to visit.     Allergies   Allergen Reactions    No Active Allergies        Current Outpatient Medications on File Prior to Visit   Medication Sig Dispense Refill    cholecalciferol (VITAMIN D3) 1,000 units tablet Take 1,000 Units by mouth daily      DULoxetine (CYMBALTA) 60 mg delayed release capsule TAKE 1 CAPSULE BY MOUTH DAILY 100 capsule 1    fluticasone (FLONASE) 50 mcg/act nasal spray 1 spray into each nostril 2 (two) times a day 16 g 3    levothyroxine 125 mcg tablet TAKE 1 TABLET BY MOUTH DAILY  EARLY IN THE MORNING (EQUIVALENT TO EUTHYROX) 100 tablet 1    omeprazole (PriLOSEC) 40 MG capsule 1 tab po 30-60 minutes  "po before breakfast 90 capsule 1    Specialty Vitamins Products (magnesium, amino acid chelate,) 133 MG tablet Take 133 mg by mouth daily      vitamin B-12 (VITAMIN B-12) 500 mcg tablet Take 500 mcg by mouth daily       No current facility-administered medications on file prior to visit.       Objective   Vitals: Blood pressure 150/82, pulse 80, resp. rate 18, height 5' 2\" (1.575 m), weight 74.4 kg (164 lb).,Body mass index is 30 kg/m².    PE:  AAOx 3  WDWN  Hearing intact, no drainage from eyes  Regular rate  no audible wheezing  no abdominal distension  LE compartments soft, skin intact    bilateralknee:    Appearance:  no swelling   No ecchymosis  no obvious joint deformity   No effusion  Palpation/Tenderness:  + TTP over medial joint line  + TTP over lateral joint line   No TTP over patella  No TTP over patellar tendon  No TTP over pes anserine bursa  Active Range of Motion:  AROM: 0-120   Special Tests:  Patellar grind:  Negative  Valgus Stress Test:  negative  Varus Stress Test:  negative     Right ipsilateral hip pain with ROM    bilateralLE:    Sensation grossly intact  Palpable 2+ pulse  AT/GS/EHL intact    Imaging Studies: I have personally reviewed pertinent films in PACS  XR bilateralknee: Moderate to severe left knee osteoarthritis, moderate to severe right knee patellofemoral osteoarthritis moderate right knee osteoarthritis elsewhere.      Scribe Attestation      I,:  Julien Robles am acting as a scribe while in the presence of the attending physician.:       I,:  Yanet Denise DO personally performed the services described in this documentation    as scribed in my presence.:                "

## 2024-08-21 ENCOUNTER — RA CDI HCC (OUTPATIENT)
Dept: OTHER | Facility: HOSPITAL | Age: 69
End: 2024-08-21

## 2024-08-21 ENCOUNTER — TELEPHONE (OUTPATIENT)
Dept: ADMINISTRATIVE | Facility: OTHER | Age: 69
End: 2024-08-21

## 2024-08-21 NOTE — TELEPHONE ENCOUNTER
08/21/24 11:27 AM    Patient contacted to bring Advance Directive, POLST, or Living Will document to next scheduled pcp visit.[facility] Department left message.    Thank you.  Penelope Lara MA  [facility]

## 2024-08-24 ENCOUNTER — APPOINTMENT (OUTPATIENT)
Dept: LAB | Facility: HOSPITAL | Age: 69
End: 2024-08-24
Payer: COMMERCIAL

## 2024-08-24 DIAGNOSIS — E11.22 CONTROLLED TYPE 2 DIABETES MELLITUS WITH STAGE 3 CHRONIC KIDNEY DISEASE, WITHOUT LONG-TERM CURRENT USE OF INSULIN (HCC): ICD-10-CM

## 2024-08-24 DIAGNOSIS — N18.30 CONTROLLED TYPE 2 DIABETES MELLITUS WITH STAGE 3 CHRONIC KIDNEY DISEASE, WITHOUT LONG-TERM CURRENT USE OF INSULIN (HCC): ICD-10-CM

## 2024-08-24 DIAGNOSIS — E55.9 VITAMIN D DEFICIENCY: ICD-10-CM

## 2024-08-24 DIAGNOSIS — Z79.899 MEDICATION DOSE CHANGED: ICD-10-CM

## 2024-08-24 DIAGNOSIS — E78.5 DYSLIPIDEMIA: ICD-10-CM

## 2024-08-24 DIAGNOSIS — N18.31 STAGE 3A CHRONIC KIDNEY DISEASE (HCC): ICD-10-CM

## 2024-08-24 DIAGNOSIS — E03.9 ACQUIRED HYPOTHYROIDISM: ICD-10-CM

## 2024-08-24 DIAGNOSIS — Z11.59 ENCOUNTER FOR HEPATITIS C SCREENING TEST FOR LOW RISK PATIENT: ICD-10-CM

## 2024-08-24 LAB
25(OH)D3 SERPL-MCNC: 28.2 NG/ML (ref 30–100)
ALBUMIN SERPL BCG-MCNC: 4.1 G/DL (ref 3.5–5)
ALP SERPL-CCNC: 82 U/L (ref 34–104)
ALT SERPL W P-5'-P-CCNC: 45 U/L (ref 7–52)
ANION GAP SERPL CALCULATED.3IONS-SCNC: 5 MMOL/L (ref 4–13)
AST SERPL W P-5'-P-CCNC: 33 U/L (ref 13–39)
BILIRUB SERPL-MCNC: 0.54 MG/DL (ref 0.2–1)
BUN SERPL-MCNC: 16 MG/DL (ref 5–25)
CALCIUM SERPL-MCNC: 9.7 MG/DL (ref 8.4–10.2)
CHLORIDE SERPL-SCNC: 104 MMOL/L (ref 96–108)
CHOLEST SERPL-MCNC: 213 MG/DL
CO2 SERPL-SCNC: 31 MMOL/L (ref 21–32)
CREAT SERPL-MCNC: 0.99 MG/DL (ref 0.6–1.3)
EST. AVERAGE GLUCOSE BLD GHB EST-MCNC: 143 MG/DL
GFR SERPL CREATININE-BSD FRML MDRD: 59 ML/MIN/1.73SQ M
GLUCOSE P FAST SERPL-MCNC: 110 MG/DL (ref 65–99)
HBA1C MFR BLD: 6.6 %
HDLC SERPL-MCNC: 55 MG/DL
LDLC SERPL CALC-MCNC: 137 MG/DL (ref 0–100)
NONHDLC SERPL-MCNC: 158 MG/DL
POTASSIUM SERPL-SCNC: 4.6 MMOL/L (ref 3.5–5.3)
PROT SERPL-MCNC: 6.8 G/DL (ref 6.4–8.4)
SODIUM SERPL-SCNC: 140 MMOL/L (ref 135–147)
TRIGL SERPL-MCNC: 106 MG/DL
TSH SERPL DL<=0.05 MIU/L-ACNC: 2.93 UIU/ML (ref 0.45–4.5)

## 2024-08-24 PROCEDURE — 84443 ASSAY THYROID STIM HORMONE: CPT

## 2024-08-24 PROCEDURE — 80061 LIPID PANEL: CPT

## 2024-08-24 PROCEDURE — 86803 HEPATITIS C AB TEST: CPT

## 2024-08-24 PROCEDURE — 36415 COLL VENOUS BLD VENIPUNCTURE: CPT

## 2024-08-24 PROCEDURE — 80053 COMPREHEN METABOLIC PANEL: CPT

## 2024-08-24 PROCEDURE — 83036 HEMOGLOBIN GLYCOSYLATED A1C: CPT

## 2024-08-24 PROCEDURE — 82306 VITAMIN D 25 HYDROXY: CPT

## 2024-08-25 LAB — HCV AB SER QL: NORMAL

## 2024-08-27 ENCOUNTER — OFFICE VISIT (OUTPATIENT)
Dept: FAMILY MEDICINE CLINIC | Facility: CLINIC | Age: 69
End: 2024-08-27
Payer: COMMERCIAL

## 2024-08-27 VITALS
WEIGHT: 163.2 LBS | BODY MASS INDEX: 30.03 KG/M2 | HEIGHT: 62 IN | DIASTOLIC BLOOD PRESSURE: 80 MMHG | SYSTOLIC BLOOD PRESSURE: 118 MMHG

## 2024-08-27 DIAGNOSIS — F41.1 GENERALIZED ANXIETY DISORDER: ICD-10-CM

## 2024-08-27 DIAGNOSIS — E66.3 OVERWEIGHT: ICD-10-CM

## 2024-08-27 DIAGNOSIS — E78.5 DYSLIPIDEMIA: ICD-10-CM

## 2024-08-27 DIAGNOSIS — N18.30 CONTROLLED TYPE 2 DIABETES MELLITUS WITH STAGE 3 CHRONIC KIDNEY DISEASE, WITHOUT LONG-TERM CURRENT USE OF INSULIN (HCC): Primary | ICD-10-CM

## 2024-08-27 DIAGNOSIS — Z79.899 MEDICATION DOSE CHANGED: ICD-10-CM

## 2024-08-27 DIAGNOSIS — E55.9 VITAMIN D DEFICIENCY: ICD-10-CM

## 2024-08-27 DIAGNOSIS — E11.22 CONTROLLED TYPE 2 DIABETES MELLITUS WITH STAGE 3 CHRONIC KIDNEY DISEASE, WITHOUT LONG-TERM CURRENT USE OF INSULIN (HCC): Primary | ICD-10-CM

## 2024-08-27 DIAGNOSIS — E03.9 ACQUIRED HYPOTHYROIDISM: ICD-10-CM

## 2024-08-27 DIAGNOSIS — N18.31 STAGE 3A CHRONIC KIDNEY DISEASE (HCC): ICD-10-CM

## 2024-08-27 PROCEDURE — 99214 OFFICE O/P EST MOD 30 MIN: CPT | Performed by: FAMILY MEDICINE

## 2024-08-27 PROCEDURE — G2211 COMPLEX E/M VISIT ADD ON: HCPCS | Performed by: FAMILY MEDICINE

## 2024-08-27 RX ORDER — LEVOTHYROXINE SODIUM 125 UG/1
125 TABLET ORAL DAILY
Qty: 100 TABLET | Refills: 1 | Status: SHIPPED | OUTPATIENT
Start: 2024-08-27

## 2024-08-27 RX ORDER — DULOXETIN HYDROCHLORIDE 60 MG/1
60 CAPSULE, DELAYED RELEASE ORAL DAILY
Qty: 100 CAPSULE | Refills: 1 | Status: SHIPPED | OUTPATIENT
Start: 2024-08-27

## 2024-08-27 RX ORDER — ATORVASTATIN CALCIUM 20 MG/1
20 TABLET, FILM COATED ORAL DAILY
Qty: 90 TABLET | Refills: 1 | Status: SHIPPED | OUTPATIENT
Start: 2024-08-27

## 2024-08-27 NOTE — ASSESSMENT & PLAN NOTE
Dileep diabetes is controlled with diet We discussed standard of care regarding ACE and statin She has agreed to statin She will have eye exam in 11/2024 her foot exam today was normal Patient to start Diabetic education low carb diet was given She will see me in 4 months   Lab Results   Component Value Date    HGBA1C 6.6 (H) 08/24/2024

## 2024-08-27 NOTE — PROGRESS NOTES
Ambulatory Visit  Name: Gela Chamorro      : 1955      MRN: [id number]  Encounter Provider: Yanet Rhodes DO  Encounter Date: 2024   Encounter department: [facility]    Assessment & Plan   1. Controlled type 2 diabetes mellitus with stage 3 chronic kidney disease, without long-term current use of insulin (HCC)  Assessment & Plan:  Patietn diabetes is controlled with diet We discussed standard of care regarding ACE and statin She has agreed to statin She will have eye exam in 2024 her foot exam today was normal Patient to start Diabetic education low carb diet was given She will see me in 4 months   Lab Results   Component Value Date    HGBA1C 6.6 (H) 2024     Orders:  -     Comprehensive metabolic panel; Future; Expected date: 2024  -     Lipid Panel with Direct LDL reflex; Future; Expected date: 2024  -     Hemoglobin A1C; Future; Expected date: 2024  -     atorvastatin (LIPITOR) 20 mg tablet; Take 1 tablet (20 mg total) by mouth daily  -     Ambulatory referral to Diabetic Education - use to refer for diabetes group classes, individual diabetes education, medical nutrition therapy, device training; Future; Expected date: 2024  2. Stage 3a chronic kidney disease (HCC)  Assessment & Plan:  Lab Results   Component Value Date    EGFR 59 2024    EGFR 53 2024    EGFR 56 2023    CREATININE 0.99 2024    CREATININE 1.07 2024    CREATININE 1.03 2023   Patient is euvolemic Patient renal function is unchanged Avoid NSAIDS and monitor diabetes and blood pressure followup in4 motnhs   Orders:  -     Comprehensive metabolic panel; Future; Expected date: 2024  3. Dyslipidemia  Assessment & Plan:  LDL goal is <80 Hers was 137 Her ASCVD score was also reviewed with patient discussed goal of statin and possible side effects patient to start lipitor 20 mg daily and repeat labs in 3 months I will see her in 4  months   Orders:  -     Comprehensive metabolic panel; Future; Expected date: 11/27/2024  -     Lipid Panel with Direct LDL reflex; Future; Expected date: 11/27/2024  -     atorvastatin (LIPITOR) 20 mg tablet; Take 1 tablet (20 mg total) by mouth daily  4. Overweight  Assessment & Plan:  Patient weight is down a few pounds I recommended exercise for patient and will see her in 4months  5. Acquired hypothyroidism  Assessment & Plan:  TSH and T4 are both in normal range on 8/24/2024 She will cotninue same dose and repeat labs in 6 months   Orders:  -     levothyroxine 125 mcg tablet; Take 1 tablet (125 mcg total) by mouth daily  6. Vitamin D deficiency  Assessment & Plan:  Continue supplement   7. Generalized anxiety disorder  Assessment & Plan:  Continue cymbalta  Orders:  -     DULoxetine (CYMBALTA) 60 mg delayed release capsule; Take 1 capsule (60 mg total) by mouth daily  8. Medication dose changed  -     levothyroxine 125 mcg tablet; Take 1 tablet (125 mcg total) by mouth daily       History of Present Illness     Patient is here for followup of diet controlled diabetes with stage 3 renal disease hypothyroidism vitamin D deficiency anxiety/fibromyalgia and her weight patietn is tolerating thyeoid medication with no issues The cymbalta has controlled anxiety and the fibromyalgia with no issues Patietn albs from 8/24/2024 were reviewed A1c is 6.6 LDL is 137 Thyroid numbers are good Patient has made an appt with endocrinology She is interested in diabetic education She does know she likes sweets and needs to avoid         Review of Systems   Constitutional:  Negative for fatigue, fever and unexpected weight change.   HENT:  Negative for congestion, sinus pain and trouble swallowing.    Eyes:  Negative for discharge and visual disturbance.   Respiratory:  Negative for cough, chest tightness, shortness of breath and wheezing.    Cardiovascular:  Negative for chest pain, palpitations and leg swelling.  "  Gastrointestinal:  Negative for abdominal pain, blood in stool, constipation, diarrhea, nausea and vomiting.   Genitourinary:  Negative for difficulty urinating, dysuria, frequency and hematuria.   Musculoskeletal:  Negative for arthralgias, gait problem and joint swelling.   Skin:  Negative for rash and wound.   Allergic/Immunologic: Negative for environmental allergies and food allergies.   Neurological:  Negative for dizziness, syncope, weakness, numbness and headaches.   Hematological:  Negative for adenopathy. Does not bruise/bleed easily.   Psychiatric/Behavioral:  Negative for confusion, decreased concentration and sleep disturbance. The patient is not nervous/anxious.        Objective     /80   Ht 5' 2\" (1.575 m)   Wt 74 kg (163 lb 3.2 oz)   BMI 29.85 kg/m²     Physical Exam  Vitals and nursing note reviewed.   Constitutional:       Appearance: Normal appearance. She is well-developed.   HENT:      Head: Normocephalic and atraumatic.      Right Ear: Hearing, tympanic membrane and external ear normal.      Left Ear: Hearing, tympanic membrane and external ear normal.   Eyes:      Extraocular Movements: Extraocular movements intact.      Conjunctiva/sclera: Conjunctivae normal.      Pupils: Pupils are equal, round, and reactive to light.   Neck:      Thyroid: No thyromegaly.   Cardiovascular:      Rate and Rhythm: Normal rate and regular rhythm.      Pulses: no weak pulses.           Dorsalis pedis pulses are 2+ on the right side and 2+ on the left side.        Posterior tibial pulses are 2+ on the right side and 2+ on the left side.      Heart sounds: Normal heart sounds.   Pulmonary:      Effort: Pulmonary effort is normal.      Breath sounds: Normal breath sounds. No wheezing or rales.   Abdominal:      General: Bowel sounds are normal. There is no distension.      Palpations: Abdomen is soft.      Tenderness: There is no abdominal tenderness.   Musculoskeletal:         General: No tenderness.    "   Cervical back: Neck supple.   Feet:      Right foot:      Skin integrity: No ulcer, skin breakdown, erythema, warmth, callus or dry skin.      Left foot:      Skin integrity: No ulcer, skin breakdown, erythema, warmth, callus or dry skin.   Lymphadenopathy:      Cervical: No cervical adenopathy.   Skin:     General: Skin is warm and dry.      Findings: No rash.   Neurological:      General: No focal deficit present.      Mental Status: She is alert and oriented to person, place, and time.      Cranial Nerves: No cranial nerve deficit.      Coordination: Coordination normal.   Psychiatric:         Mood and Affect: Mood normal.         Behavior: Behavior normal.         Thought Content: Thought content normal.         Judgment: Judgment normal.       Administrative Statements   Diabetic Foot Exam    Patient's shoes and socks removed.    Right Foot/Ankle   Right Foot Inspection  Skin Exam: skin normal and skin intact. No dry skin, no warmth, no callus, no erythema, no maceration, no abnormal color, no pre-ulcer, no ulcer and no callus.     Toe Exam: ROM and strength within normal limits.     Sensory   Monofilament testing: intact    Vascular  Capillary refills: < 3 seconds  The right DP pulse is 2+. The right PT pulse is 2+.     Left Foot/Ankle  Left Foot Inspection  Skin Exam: skin normal and skin intact. No dry skin, no warmth, no erythema, no maceration, normal color, no pre-ulcer, no ulcer and no callus.     Toe Exam: ROM and strength within normal limits.     Sensory   Monofilament testing: intact    Vascular  Capillary refills: < 3 seconds  The left DP pulse is 2+. The left PT pulse is 2+.     Assign Risk Category  No deformity present  No loss of protective sensation  No weak pulses  Risk: 0

## 2024-08-27 NOTE — ASSESSMENT & PLAN NOTE
Lab Results   Component Value Date    EGFR 59 08/24/2024    EGFR 53 02/17/2024    EGFR 56 06/17/2023    CREATININE 0.99 08/24/2024    CREATININE 1.07 02/17/2024    CREATININE 1.03 06/17/2023   Patient is euvolemic Patient renal function is unchanged Avoid NSAIDS and monitor diabetes and blood pressure followup in[address] [place] [address]

## 2024-08-27 NOTE — ASSESSMENT & PLAN NOTE
TSH and T4 are both in normal range on 8/24/2024 She will cotninue same dose and repeat labs in 6 months

## 2024-08-27 NOTE — ASSESSMENT & PLAN NOTE
Patient weight is down a few pounds I recommended exercise for patient and will see her in 4months

## 2024-08-27 NOTE — PATIENT INSTRUCTIONS
"Patient Education     Type 2 diabetes   The Basics   Written by the doctors and editors at [facility]   What is type 2 diabetes? -- This is a disorder that disrupts the way the body uses sugar. It is sometimes called type 2 diabetes mellitus.  All of the cells in the body need sugar to work normally. Sugar gets into the cells with the help of a hormone called insulin. Insulin is made by the pancreas, an organ in the belly. If there is not enough insulin, or if cells in the body don't respond normally to insulin, sugar builds up in the blood. That is what happens to people with diabetes.  There are 2 different types of diabetes:   In type 1 diabetes, the pancreas makes little or no insulin.   In type 2 diabetes, the pancreas still makes some insulin, but the cells in the body stop responding normally. Eventually, the pancreas cannot make enough insulin to keep up.  Having excess body weight or obesity increases a person's risk of developing type 2 diabetes. But people without excess body weight can get diabetes, too.  What are the symptoms of type 2 diabetes? -- Type 2 diabetes usually causes no symptoms. When symptoms do happen, they include:   Needing to urinate often   Intense thirst   Blurry vision  Can diabetes lead to other health problems? -- Yes. Type 2 diabetes might not make you feel sick. But if it is not managed, it can lead to serious problems over time, such as:   Heart attacks   Strokes   Kidney disease   Vision problems (or even blindness)   Pain or loss of feeling in the hands and feet   Needing to have fingers, toes, or other body parts removed (amputated)  How do I know if I have type 2 diabetes? -- Your doctor or nurse can do a blood test. There are 2 tests that can be used for this. Both involve measuring the amount of sugar in your blood, called your \"blood sugar\" or \"blood glucose\":   One of the tests measures your blood sugar at the time the blood sample is taken. This test is done in the " "morning. You can't eat or drink anything except water for at least 8 hours before the test.   The other test shows what your average blood sugar has been for the past 2 to 3 months. This blood test is called \"hemoglobin A1C\" or just \"A1C.\" It can be checked at any time of the day, even if you have recently eaten.  How is type 2 diabetes treated? -- The goals of treatment are to manage your blood sugar and lower the risk of future problems that can happen in people with diabetes.  Treatment might include:   Lifestyle changes - This is an important part of managing diabetes. It includes eating healthy foods and getting plenty of physical activity.   Medicines - There are a few medicines that help lower blood sugar. Some people need to take pills that help the body make more insulin or that help insulin do its job. Others need insulin shots.  Depending on what medicines you take, you might need to check your blood sugar regularly at home. But not everyone with type 2 diabetes needs to do this. Your doctor or nurse will tell you if you should be checking your blood sugar, and when and how to do this.  Sometimes, people with type 2 diabetes also need medicines to help prevent problems caused by the disease. For instance, medicines used to lower blood pressure can reduce the chances of a heart attack or stroke.   General medical care - It's also important to take care of other areas of your health. This includes watching your blood pressure and cholesterol levels. You should also get certain vaccines, such as vaccines to protect against the flu and coronavirus disease 2019 (\"COVID-19\"). Some people also need a vaccine to prevent pneumonia.  Can type 2 diabetes be prevented? -- Yes. To lower your chances of getting type 2 diabetes, the most important thing you can do is eat a healthy diet and get plenty of physical activity. This can help you lose weight if you are overweight. But eating well and being active are also good " for your overall health. Even gentle activity, like walking, has benefits.  If you smoke, quitting can also lower your risk of type 2 diabetes. Quitting smoking can be difficult, but your doctor or nurse can help.  All topics are updated as new evidence becomes available and our peer review process is complete.  This topic retrieved from [employer] on: Apr 24, 2024.  Topic 76344 Version 23.0  Release: 32.3.2 - C32.113  © 2024 UpToDate, Inc. and/or its affiliates. All rights reserved.  Consumer Information Use and Disclaimer   Disclaimer: This generalized information is a limited summary of diagnosis, treatment, and/or medication information. It is not meant to be comprehensive and should be used as a tool to help the user understand and/or assess potential diagnostic and treatment options. It does NOT include all information about conditions, treatments, medications, side effects, or risks that may apply to a specific patient. It is not intended to be medical advice or a substitute for the medical advice, diagnosis, or treatment of a health care provider based on the health care provider's examination and assessment of a patient's specific and unique circumstances. Patients must speak with a health care provider for complete information about their health, medical questions, and treatment options, including any risks or benefits regarding use of medications. This information does not endorse any treatments or medications as safe, effective, or approved for treating a specific patient. UpToDate, Inc. and its affiliates disclaim any warranty or liability relating to this information or the use thereof.The use of this information is governed by the Terms of Use, available at https://www.wolterskluwer.com/en/know/clinical-effectiveness-terms. 2024© UpToDate, Inc. and its affiliates and/or licensors. All rights reserved.  Copyright   © 2024 UpToDate, Inc. and/or its affiliates. All rights reserved.    Patient Education    "  Carb counting for adults with diabetes   The Basics   Written by the doctors and editors at [facility]   What is carb counting? -- This is a type of meal planning that many people with diabetes use. It is a way to figure out how many carbohydrates, or \"carbs,\" you eat.  The body breaks down the food we eat into 3 main types of nutrients: carbs, proteins, and fats. Carbs are sugars and starches that come from food. The body uses carbs for energy.  Why do I need to count carbs? -- People with diabetes need to pay attention to how many carbs they eat. This is because carbs raise your blood sugar level.  Carb counting helps you:   Choose the right amount of insulin to take before meals and snacks - If you take insulin before meals, the dose depends on several things, including how many carbs you plan to eat. (It also depends on how much you plan to exercise and your blood sugar level.)   Plan your meals and snacks for the day - You can use carb counting to figure out how many carbs to eat at each meal and snack. This helps you make sure that you eat the right amount over the entire day.   Keep your blood sugar levels well managed - Spreading out the carbs you eat over a whole day can help keep your blood sugar from getting too high. If you take insulin or another diabetes medicine that can cause low blood sugar, eating about the same amount of carbs at each meal every day also helps keep your blood sugar from getting too low. Reducing the amount of carbs you eat can help you manage your diabetes better and prevent medical problems that diabetes can cause.  Your doctor, nurse, or dietitian (food expert) can help you figure out how many carbs to try to eat each day. This will depend on your eating habits, weight, activity level, and which diabetes medicines you take.  People who take insulin before meals might need to be very careful when they count the carbs in every meal and snack. This is so they can give themselves " "the right amount of insulin. If the insulin dose doesn't match the amount of carbs, their blood sugar might get too low or too high. Other people might be able to be a little more flexible as long as they get about the same amount of carbs at each meal or throughout the day.  Which foods have carbs? -- Foods with a lot of carbs include:   Grains - These include bread, pasta, rice, and cereal.   Fruits and starchy vegetables - Starchy vegetables include potatoes, corn, and squash.   Milk and other dairy products - Dairy products include cheese and yogurt.   Foods with added sugar - These include sweets and baked goods likes cookies and cakes, as well as sugary drinks like juice and soda.  It is best to get most of your carbs from fruits, vegetables, whole grains (like whole-wheat bread, whole-grain cereals, and brown rice), and low-fat milk and dairy products.  How do I count carbs? -- To count carbs in packaged foods, check the food's nutrition label (if it has one).  On the label (figure 1), check for:   \"Total Carbohydrate\" number - This tells you how many carbs are in 1 serving size of the food. If you eat 1 serving, then the number of carbs you eat is the same as the number of total carbohydrates.   \"Serving size\" - This tells you how much food is in 1 serving. If you have 2 servings, the number of carbs will be 2 times the number of carbohydrates listed.   \"Dietary Fiber\" - Fiber is a carb that is not digested, which means that it does not raise blood sugar. Foods with a lot of fiber can help manage your blood sugar. If a food has more than 5 grams (g) of fiber, you need less insulin to cover the total carbs in that food. So, if you are calculating an insulin dose, only count the carbs that are not from fiber (figure 1).  What is exchange planning? -- Exchange planning, or the \"exchange system,\" is a way for people to plan their meals without reading labels. This can be helpful since many foods don't come with " "a nutrition label.  The exchange system involves knowing how much of different foods have about 15 grams of carbs (table 1 and table 2 and table 3). Your doctor, nurse, or dietitian gives you a certain number of \"carb choices\" to eat with each meal and snack (table 4). Each \"choice\" is a portion of food that has about 15 grams of carbs. Knowing your options makes it easier to \"exchange\" 1 carb choice for another as you plan your meals and snacks. For example, 1 small apple could be exchanged for 1/3 cup of pasta.  How can I plan my meals? -- First, make sure that you know how many carbs you should be eating each day. Ask your doctor, nurse, or dietitian if you are not sure.  Here are some tips that might help:   Spread out your carbs over 4 to 6 small meals each day instead of 3 big ones.   Eat a similar number of carbs at each meal, for example, at each dinner.   Eat your meals at a similar time each day.   Plan your meals ahead of time.   Use the \"plate method.\" This is a simpler way to make sure that you get a good balance of carbs and other nutrients with each meal. It is not as exact as counting all of your carbs, but it can be helpful for people who prefer a simpler approach. If you take insulin before meals, it is generally better to adjust your insulin dose by counting how many carbs you plan to eat or using the exchange planning strategy.  For the plate method, you start with a plate about 9 inches (23 cm) across. Fill it with (figure 2):   1/2 non-starchy vegetables   1/4 protein   1/4 carbs   Follow your doctor's instructions for how and when to check your blood sugar. This can help you learn how certain foods affect your blood sugar.   Keep track of your meals and blood sugar levels. Show this to your doctor or nurse so they can adjust your treatment if needed. If you take insulin, you will also need to keep track of your exercise patterns and how much insulin you give yourself with each dose.   If you " "take insulin, make sure that you understand how to use it. This includes knowing how to adjust the dose based on your blood sugar level and what you plan to eat. Foods that have a lot of protein or fat also can affect your blood sugar level. Some people need to adjust their insulin doses when they eat these foods.   Remember that other things besides carbs can raise or lower your blood sugar level. These things can include exercise, getting sick, drinking alcohol, traveling, and stress. If you take insulin, make sure that you know how and when to adjust your dose in these situations.  If you are having trouble counting carbs or managing your blood sugar, talk to your doctor or nurse. They can help. A dietitian can also help you plan specific menus that will give you the right amount of carbs each day.  For more information, you can also get a book on counting carbs or check the American Diabetes Association website (www.diabetes.org).  All topics are updated as new evidence becomes available and our peer review process is complete.  This topic retrieved from [employer] on: Mar 27, 2024.  Topic 63078 Version 11.0  Release: 32.2.4 - C32.85  © 2024 UpToDate, Inc. and/or its affiliates. All rights reserved.  figure 1: Counting carbohydrates     To figure out the \"carb count\" in 1 serving, start with the number of grams of total carbohydrates (46 grams), then subtract the number of grams of dietary fiber (7 grams). It's also important to look at the serving size. In this example, the carb count is 39 grams. You can use this number when counting carbs for your insulin dose.  Graphic 76641 Version 8.0  table 1: Bread and grains with 15 grams of carbs*  Bread    Food  Serving size    Bagel 1/4 large bagel (1 oz)   Biscuit 1 biscuit (2.5 inches across)   Bread, reduced calorie, light 2 slices (1.5 oz)   Cornbread 1.75 inch cube (1.5 oz)   English muffin 1/2 muffin   Hot dog or hamburger bun 1/2 bun (3/4 oz)   Naan, chapati, or " "roti 1 oz   Pancake 1 pancake (4 inches across, 1/4 inch thick)   Rea (6 inches across) 1/2 rea   Tortilla, corn 1 small tortilla (6 inches across)   Tortilla, flour (white or whole wheat) 1 small tortilla (6 inches across) or 1/3 large tortilla (10 inches across)   Waffle 1 waffle (4-inch square or 4 inches across)   Cereals and grains (including pasta and rice)    Food  Serving size (cooked)    Barley, couscous, millet, pasta (white or whole wheat, all shapes and sizes), polenta, quinoa (all colors), or rice (white, brown, and other colors and types) 1/3 cup   Bran cereal (twigs, buds, or flakes), shredded wheat (plain), or sugar-coated cereal 1/2 cup   Bulgur, kasha, tabbouleh (tabouli), or wild rice 1/2 cup   Granola cereal 1/4 cup   Hot cereal (oats, oatmeal, grits) 1/2 cup   Unsweetened, ready-to-eat cereal 3/4 cup   * For bread and grains, 15 grams of carbs is considered 1 serving or \"choice\" for people who need to count carbs.  Graphic 039063 Version 1.0  table 2: Fruits with 15 grams of carbs*  Food  Serving size    Applesauce, unsweetened 1/2 cup   Banana 1 extra small banana, about 4 inches long (4 oz)   Blueberries 3/4 cup   Dried fruits (blueberries, cherries, cranberries, mixed fruit, raisins) 2 tbsp   Fruit, canned 1/2 cup   Fruit, whole, small (apple) 1 small fruit (4 oz)   Fruit, whole, medium (nectarine, orange, pear, tangerine) 1 medium fruit (6 oz)   Fruit juice, unsweetened 1/2 cup   Grapes 17 small grapes (3 oz)   Melon, diced 1 cup   Strawberries, whole 1 and 1/4 cups   When listed, weight (oz) includes skin and seeds. If you are not sure if your fruit is the right size for 1 serving, you can use a food scale to check the weight.  * For fruits, 15 grams of carbs is considered 1 serving or \"choice\" for people who need to count carbs.  Graphic 745339 Version 1.0  table 3: Starchy vegetables with 15 grams of carbs*  Food  Serving size (cooked)    Cassava, dasheen, or plantain 1/3 cup   Corn, " "green peas, mixed vegetables, or parsnips 1/2 cup   Marinara, pasta, or spaghetti sauce 1/2 cup   Mixed vegetables (with corn or peas) 1 cup   Potato, baked with skin 1/4 large (3 oz)   Potato, Setswana-fried (oven-baked) 1 cup (2 oz)   Potato, mashed with milk and fat 1/2 cup   Squash, winter (acorn, butternut) 1 cup   Yam or sweet potato, plain 1/2 cup (3 and 1/2 oz)   If you are not sure if your vegetable is the right size for 1 serving, you can use a food scale to check the weight.  * For starchy vegetables, 15 grams of carbs is considered 1 serving or \"choice\" for people who need to count carbs.  Graphic 665111 Version 1.0  table 4: Sample exchange system meal plan  Time  Exchange pattern  Sample menu  Carbohydrate count (g)    8 am 3 carbohydrate group    2 starch 1 English muffin 30    1 fruit 1 1/4 c strawberries 15    1 protein group 1/4 c cottage cheese -    1 fat group 1 tsp margarine -      Total: 45    12 noon 4 carbohydrate group    2 starch 2 slices of bread 30    1 fruit 1 orange 15    1 vegetable 1 c salad -    1 milk 8 oz skim milk 12    3 protein group 3 oz chicken -    1 fat group 1 tbsp low fat michaels -      Total: 57    3 pm 1 carbohydrate group    1 fruit or 1 starch 1 apple or 6 crackers 15      Total: 15    6 pm 4 carbohydrate group    2 starch 1 c potato 30    1 fruit 1/2 c fruit salad 15    1 vegetable 1 c salad -    1 milk 8 oz skim milk 12    6 protein group 6 oz fish -    1 fat group 2 tbsp low fat salad dressing -      Total: 57    9 pm 1 carbohydrate group    1 starch 6 crackers 15    1 protein 2 tbsp peanut butter -      Total: 15    Graphic 57778 Version 3.0  figure 2: The \"plate method\"     For the plate method, you start with a plate about 9 inches (23 cm) across. Then fill it with 1/2 non-starchy vegetables, 1/4 protein, and 1/4 carbs.  Graphic 436270 Version 2.0  Consumer Information Use and Disclaimer   Disclaimer: This generalized information is a limited summary of diagnosis, " treatment, and/or medication information. It is not meant to be comprehensive and should be used as a tool to help the user understand and/or assess potential diagnostic and treatment options. It does NOT include all information about conditions, treatments, medications, side effects, or risks that may apply to a specific patient. It is not intended to be medical advice or a substitute for the medical advice, diagnosis, or treatment of a health care provider based on the health care provider's examination and assessment of a patient's specific and unique circumstances. Patients must speak with a health care provider for complete information about their health, medical questions, and treatment options, including any risks or benefits regarding use of medications. This information does not endorse any treatments or medications as safe, effective, or approved for treating a specific patient. UpToDate, Inc. and its affiliates disclaim any warranty or liability relating to this information or the use thereof.The use of this information is governed by the Terms of Use, available at https://www.woltersMarport Deep Sea Technologiesuwer.com/en/know/clinical-effectiveness-terms. 2024© UpToDate, Inc. and its affiliates and/or licensors. All rights reserved.  Copyright   © 2024 UpToDate, Inc. and/or its affiliates. All rights reserved.

## 2024-08-27 NOTE — ASSESSMENT & PLAN NOTE
LDL goal is <80 Hers was 137 Her ASCVD score was also reviewed with patient discussed goal of statin and possible side effects patient to start lipitor 20 mg daily and repeat labs in 3 months I will see her in 4 months

## 2024-09-11 ENCOUNTER — TELEPHONE (OUTPATIENT)
Age: 69
End: 2024-09-11

## 2024-09-11 NOTE — TELEPHONE ENCOUNTER
Patient states she has been having headaches on and off, frequent urination at night with some burning since starting the 20mg Lipitor. She is asking if something else can be sent for her as an alternative or if these side effects could be related to something else.

## 2024-09-12 DIAGNOSIS — R30.0 DYSURIA: Primary | ICD-10-CM

## 2024-09-14 ENCOUNTER — APPOINTMENT (OUTPATIENT)
Dept: LAB | Facility: HOSPITAL | Age: 69
End: 2024-09-14
Payer: COMMERCIAL

## 2024-09-14 DIAGNOSIS — N18.30 CONTROLLED TYPE 2 DIABETES MELLITUS WITH STAGE 3 CHRONIC KIDNEY DISEASE, WITHOUT LONG-TERM CURRENT USE OF INSULIN (HCC): ICD-10-CM

## 2024-09-14 DIAGNOSIS — E11.22 CONTROLLED TYPE 2 DIABETES MELLITUS WITH STAGE 3 CHRONIC KIDNEY DISEASE, WITHOUT LONG-TERM CURRENT USE OF INSULIN (HCC): ICD-10-CM

## 2024-09-14 DIAGNOSIS — E78.5 DYSLIPIDEMIA: ICD-10-CM

## 2024-09-14 DIAGNOSIS — R30.0 DYSURIA: ICD-10-CM

## 2024-09-14 DIAGNOSIS — N18.31 STAGE 3A CHRONIC KIDNEY DISEASE (HCC): ICD-10-CM

## 2024-09-14 PROCEDURE — 87086 URINE CULTURE/COLONY COUNT: CPT

## 2024-09-15 LAB — BACTERIA UR CULT: NORMAL

## 2024-09-16 ENCOUNTER — TELEPHONE (OUTPATIENT)
Dept: FAMILY MEDICINE CLINIC | Facility: CLINIC | Age: 69
End: 2024-09-16

## 2024-09-16 DIAGNOSIS — N30.00 ACUTE CYSTITIS WITHOUT HEMATURIA: Primary | ICD-10-CM

## 2024-09-16 RX ORDER — CEPHALEXIN 500 MG/1
500 CAPSULE ORAL 2 TIMES DAILY
Qty: 14 CAPSULE | Refills: 0 | Status: SHIPPED | OUTPATIENT
Start: 2024-09-16 | End: 2024-09-23

## 2024-09-16 NOTE — TELEPHONE ENCOUNTER
----- Message from Yanet Rhodes DO sent at 9/16/2024  9:51 AM EDT -----  Call patient urine shows a possible infection I am calling in keflex 500mg BID for 7 days now

## 2024-10-02 NOTE — ASSESSMENT & PLAN NOTE
October 3, 2024     Abby Wood DO  [address]  [address]  [place] [address]    Patient: Fady Molina   YOB: 1992   Date of Visit: 10/2/2024       Dear Dr. Wood:    Thank you for referring Fady Molina to me for evaluation. Below are my notes for this consultation.    If you have questions, please do not hesitate to call me. I look forward to following your patient along with you.         Sincerely,        Travis Del Valle MD        CC: No Recipients    Travis Del Valle MD  10/2/2024 10:29 AM  Sign when Signing Visit  Please refer to the [facility] ultrasound report in Ob Procedures for additional information regarding today's visit   TSH on 2/17/2024 was >10 I have increased the thyroid medication to 150 mcg daily Patient to have repeat labs in 8 weeks I discussed also the proper way to take thyroid medication I will see her in 6 months

## 2024-10-25 ENCOUNTER — TELEPHONE (OUTPATIENT)
Age: 69
End: 2024-10-25

## 2024-10-25 NOTE — TELEPHONE ENCOUNTER
Pt inquiring if it would be reccommended she use a glucometer as she stated she is prediabetic. Please advise,

## 2024-11-07 LAB
LEFT EYE DIABETIC RETINOPATHY: NORMAL
RIGHT EYE DIABETIC RETINOPATHY: NORMAL

## 2024-11-13 DIAGNOSIS — F41.1 GENERALIZED ANXIETY DISORDER: ICD-10-CM

## 2024-11-13 DIAGNOSIS — E03.9 ACQUIRED HYPOTHYROIDISM: ICD-10-CM

## 2024-11-13 DIAGNOSIS — Z79.899 MEDICATION DOSE CHANGED: ICD-10-CM

## 2024-11-13 RX ORDER — LEVOTHYROXINE SODIUM 125 UG/1
125 TABLET ORAL DAILY
Qty: 100 TABLET | Refills: 1 | Status: SHIPPED | OUTPATIENT
Start: 2024-11-13

## 2024-11-13 RX ORDER — DULOXETIN HYDROCHLORIDE 60 MG/1
60 CAPSULE, DELAYED RELEASE ORAL DAILY
Qty: 100 CAPSULE | Refills: 1 | Status: SHIPPED | OUTPATIENT
Start: 2024-11-13

## 2024-12-20 ENCOUNTER — APPOINTMENT (OUTPATIENT)
Dept: LAB | Facility: HOSPITAL | Age: 69
End: 2024-12-20
Payer: COMMERCIAL

## 2024-12-20 LAB
ALBUMIN SERPL BCG-MCNC: 4 G/DL (ref 3.5–5)
ALP SERPL-CCNC: 86 U/L (ref 34–104)
ALT SERPL W P-5'-P-CCNC: 37 U/L (ref 7–52)
ANION GAP SERPL CALCULATED.3IONS-SCNC: 6 MMOL/L (ref 4–13)
AST SERPL W P-5'-P-CCNC: 26 U/L (ref 13–39)
BILIRUB SERPL-MCNC: 0.41 MG/DL (ref 0.2–1)
BUN SERPL-MCNC: 18 MG/DL (ref 5–25)
CALCIUM SERPL-MCNC: 9.5 MG/DL (ref 8.4–10.2)
CHLORIDE SERPL-SCNC: 105 MMOL/L (ref 96–108)
CHOLEST SERPL-MCNC: 162 MG/DL (ref ?–200)
CO2 SERPL-SCNC: 28 MMOL/L (ref 21–32)
CREAT SERPL-MCNC: 0.86 MG/DL (ref 0.6–1.3)
EST. AVERAGE GLUCOSE BLD GHB EST-MCNC: 146 MG/DL
GFR SERPL CREATININE-BSD FRML MDRD: 69 ML/MIN/1.73SQ M
GLUCOSE P FAST SERPL-MCNC: 104 MG/DL (ref 65–99)
HBA1C MFR BLD: 6.7 %
HDLC SERPL-MCNC: 54 MG/DL
LDLC SERPL CALC-MCNC: 86 MG/DL (ref 0–100)
POTASSIUM SERPL-SCNC: 4.3 MMOL/L (ref 3.5–5.3)
PROT SERPL-MCNC: 7.2 G/DL (ref 6.4–8.4)
SODIUM SERPL-SCNC: 139 MMOL/L (ref 135–147)
TRIGL SERPL-MCNC: 111 MG/DL (ref ?–150)

## 2024-12-23 ENCOUNTER — RESULTS FOLLOW-UP (OUTPATIENT)
Dept: FAMILY MEDICINE CLINIC | Facility: CLINIC | Age: 69
End: 2024-12-23

## 2024-12-28 ENCOUNTER — RA CDI HCC (OUTPATIENT)
Dept: OTHER | Facility: HOSPITAL | Age: 69
End: 2024-12-28

## 2025-01-03 ENCOUNTER — OFFICE VISIT (OUTPATIENT)
Dept: FAMILY MEDICINE CLINIC | Facility: CLINIC | Age: 70
End: 2025-01-03
Payer: COMMERCIAL

## 2025-01-03 VITALS
BODY MASS INDEX: 30.59 KG/M2 | SYSTOLIC BLOOD PRESSURE: 148 MMHG | DIASTOLIC BLOOD PRESSURE: 92 MMHG | WEIGHT: 166.2 LBS | HEART RATE: 108 BPM | OXYGEN SATURATION: 98 % | HEIGHT: 62 IN | TEMPERATURE: 97.5 F

## 2025-01-03 DIAGNOSIS — E03.9 ACQUIRED HYPOTHYROIDISM: ICD-10-CM

## 2025-01-03 DIAGNOSIS — E11.22 CONTROLLED TYPE 2 DIABETES MELLITUS WITH STAGE 3 CHRONIC KIDNEY DISEASE, WITHOUT LONG-TERM CURRENT USE OF INSULIN (HCC): Primary | ICD-10-CM

## 2025-01-03 DIAGNOSIS — N18.30 CONTROLLED TYPE 2 DIABETES MELLITUS WITH STAGE 3 CHRONIC KIDNEY DISEASE, WITHOUT LONG-TERM CURRENT USE OF INSULIN (HCC): Primary | ICD-10-CM

## 2025-01-03 DIAGNOSIS — R03.0 ELEVATED BP WITHOUT DIAGNOSIS OF HYPERTENSION: ICD-10-CM

## 2025-01-03 DIAGNOSIS — M79.7 FIBROMYALGIA: ICD-10-CM

## 2025-01-03 DIAGNOSIS — E78.5 DYSLIPIDEMIA: ICD-10-CM

## 2025-01-03 DIAGNOSIS — E55.9 VITAMIN D DEFICIENCY: ICD-10-CM

## 2025-01-03 DIAGNOSIS — N18.31 STAGE 3A CHRONIC KIDNEY DISEASE (HCC): ICD-10-CM

## 2025-01-03 PROCEDURE — G2211 COMPLEX E/M VISIT ADD ON: HCPCS | Performed by: FAMILY MEDICINE

## 2025-01-03 PROCEDURE — 99214 OFFICE O/P EST MOD 30 MIN: CPT | Performed by: FAMILY MEDICINE

## 2025-01-03 RX ORDER — BLOOD-GLUCOSE METER
EACH MISCELLANEOUS
COMMUNITY
Start: 2024-11-04

## 2025-01-03 RX ORDER — ATORVASTATIN CALCIUM 20 MG/1
20 TABLET, FILM COATED ORAL DAILY
Qty: 90 TABLET | Refills: 1 | Status: SHIPPED | OUTPATIENT
Start: 2025-01-03

## 2025-01-03 NOTE — ASSESSMENT & PLAN NOTE
Blood pressure recheck today in the office and found to be 148/92.  Patient is currently asymptomatic aside from a small headache attributing from lack of sleep due to a situational stressor.  We discussed the benefits of starting a ACE inhibitor given her diabetes and stage IIIa CKD.  She would like to continue monitoring her blood pressures at home manually and record them and let us know of her readings as the elevated blood pressure today in the office is multifactorial/situational in etiology. Followup 3 [facility]

## 2025-01-03 NOTE — ASSESSMENT & PLAN NOTE
Patient feels that this is well-controlled with her Cymbalta 60 mg for generalized aches and pains.  Continue this medication.

## 2025-01-03 NOTE — PATIENT INSTRUCTIONS
"Patient Education     Controlling your blood pressure through lifestyle   The Basics   Written by the doctors and editors at [facility]   What does my lifestyle have to do with my blood pressure? -- The things you do and the foods you eat have a big effect on your blood pressure and your overall health. Following the right lifestyle can:   Lower your blood pressure, or keep you from getting high blood pressure in the first place   Reduce your need for blood pressure medicines   Make medicines for high blood pressure work better, if you do take them   Lower the chances that you'll have a heart attack or stroke, or develop kidney disease  Which lifestyle choices will help lower my blood pressure? -- You can:   Lose weight (if you are overweight).   Choose a diet rich in fruits, vegetables, and low-fat dairy products, and low in meats, sweets, and refined grains.   Eat less salt (sodium).   Do something active for at least 30 minutes a day on most days of the week.   Limit the amount of alcohol you drink.  If you have high blood pressure, it's also very important to quit smoking (if you smoke). Quitting smoking might not bring your blood pressure down. But it will lower the chances that you'll have a heart attack or stroke, and it will help you feel better and live longer.  Start low, and go slow -- The changes listed above might sound like a lot, but don't worry. You don't have to change everything all at once. The key to improving your lifestyle is to \"start low, and go slow.\" Choose 1 small, specific thing to change, and try doing it for a while. If it works for you, keep doing it until it becomes a habit. If it doesn't, don't give up. Choose something else to change, and see how that goes.  Let's say, for example, that you would like to improve your diet. If you're the type of person who eats cheeseburgers and French fries often, you can't switch to eating just salads from one day to the next. When people try to make " "changes like that, they often fail. Then, they feel frustrated and tend to give up. So instead of trying to change everything about your diet in 1 day, change 1 or 2 small things about your diet and give yourself time to get used to those changes. For instance, keep the cheeseburger but give up the French fries. Or eat the same things, but cut your portions in half.  As you find things that you are able to change and stick with, keep adding new changes. In time, you will see that you can actually change a lot. You just have to get used to the changes slowly.  Lose weight -- When people think about losing weight, they sometimes make it more complicated than it really is. To lose weight, you have to either eat less or move more. If you do both of those things, it's even better. But there is no single weight loss diet or activity that's better than any other. When it comes to weight loss, the most effective plan is the one that you'll stick with.  Improve your diet -- There is no single diet that is right for everyone. But in general, a healthy diet can include:   Lots of fruits, vegetables, and whole grains   Some beans, peas, lentils, chickpeas, and similar foods   Some nuts, such as walnuts, almonds, and peanuts   Fat-free or low-fat milk and milk products   Some fish  To have a healthy diet, it's also important to limit or avoid sugar, sweets, meats, and refined grains. (Refined grains are found in white bread, white rice, most forms of pasta, and most packaged \"snack\" foods.)  Reduce salt -- Many people think that eating a low-sodium diet means avoiding the salt shaker and not adding salt when cooking. The truth is, not adding salt at the table or when you cook will only help a little. Almost all of the sodium you eat is already in the food you buy at the grocery store or at restaurants (figure 1).  The most important thing you can do to cut down on sodium is to eat less processed food. That means that you should " "avoid most foods that are sold in cans, boxes, jars, and bags. You should also eat in restaurants less often.  To reduce the amount of sodium you eat, buy fresh or fresh-frozen fruits, vegetables, and meats. (Fresh-frozen foods have had nothing added to them before freezing.) Then, you can make meals at home, from scratch, with these ingredients.  As with the other changes, don't try to cut out salt all at once. Instead, choose 1 or 2 foods that have a lot of sodium and try to replace them with low-sodium choices. When you get used to those low-sodium options, find another food or 2 to change. Then, keep going, until all of the foods you eat are sodium-free or low in sodium.  Become more active -- If you want to be more active, you don't have to go to the gym or get all sweaty. It is possible to increase your activity level while doing everyday things you enjoy. Walking, gardening, and dancing are just a few of the things that you might try. As with all the other changes, the key is not to do too much too fast. If you don't do any activity now, start by walking for just a few minutes every other day. Do that for a few weeks. If you stick with it, try doing it for longer. But if you find that you don't like walking, try a different activity.  Drink less alcohol -- If you are female, do not have more than 1 \"standard drink\" of alcohol a day. If you are male, do not have more than 2. A \"standard drink\" is:   A can or bottle that has 12 ounces of beer   A glass that has 5 ounces of wine   A shot that has 1.5 ounces of hard alcohol  Where should I start? -- If you want to improve your lifestyle, start by making the changes that you think would be easiest for you. If you used to exercise and just got out of the habit, maybe it would be easy for you to start exercising again. Or if you actually like cooking meals from scratch, maybe the first thing you should focus on is eating home-cooked meals that are low in " "sodium.  Whatever you tackle first, choose specific, realistic goals, and give yourself a deadline. For example, do not decide that you are going to \"exercise more.\" Instead, decide that you are going to walk for 10 minutes on Monday, Wednesday, and Friday, and that you are going to do this for the next 2 weeks.  When lifestyle changes are too general, people have a hard time following through.  Now go. You can do it!  All topics are updated as new evidence becomes available and our peer review process is complete.  This topic retrieved from [employer] on: Feb 28, 2024.  Topic 07745 Version 15.0  Release: 32.2.4 - C32.58  © 2024 UpToDate, Inc. and/or its affiliates. All rights reserved.  figure 1: Sources of sodium in your diet     Graphic 93833 Version 2.0  Consumer Information Use and Disclaimer   Disclaimer: This generalized information is a limited summary of diagnosis, treatment, and/or medication information. It is not meant to be comprehensive and should be used as a tool to help the user understand and/or assess potential diagnostic and treatment options. It does NOT include all information about conditions, treatments, medications, side effects, or risks that may apply to a specific patient. It is not intended to be medical advice or a substitute for the medical advice, diagnosis, or treatment of a health care provider based on the health care provider's examination and assessment of a patient's specific and unique circumstances. Patients must speak with a health care provider for complete information about their health, medical questions, and treatment options, including any risks or benefits regarding use of medications. This information does not endorse any treatments or medications as safe, effective, or approved for treating a specific patient. UpToDate, Inc. and its affiliates disclaim any warranty or liability relating to this information or the use thereof.The use of this information is governed by " the Terms of Use, available at https://www.[employer].com/en/know/clinical-effectiveness-terms. 2024© [employer] and its affiliates and/or licensors. All rights reserved.  Copyright   © 2024 [employer] and/or its affiliates. All rights reserved.

## 2025-01-03 NOTE — ASSESSMENT & PLAN NOTE
Vitamin D level in August 2024 was 28.2. 125 mics daily patient is currently supplementing with with vitamin D daily.  Continue this medication and we will recheck level in 6 months.     Orders:    Vitamin D 25 hydroxy; Future

## 2025-01-03 NOTE — ASSESSMENT & PLAN NOTE
Continue statin 20 mg.  Patient denies any adverse effects.  Patient is adhering to diet lifestyle modifications.  Will recheck level in 6 months. Order placed for refill.   Reviewed guidelines and LDL goal of <70 recommended for aged 40-75 w/ her ASCVD score. Can consider increasing statin to 40 mg next visit if agreeable.  LDL was at 86 down from 137     The 10-year ASCVD risk score (Jeffrey LIGHT, et al., 2019) is: 17.3%    Values used to calculate the score:      Age: 69 years      Sex: Female      Is Non- : No      Diabetic: Yes      Tobacco smoker: No      Systolic Blood Pressure: 148 mmHg      Is BP treated: No      HDL Cholesterol: 54 mg/dL      Total Cholesterol: 162 mg/dL   Lab Results   Component Value Date    LDLCALC 86 12/20/2024      Orders:    Lipid Panel with Direct LDL reflex; Future    atorvastatin (LIPITOR) 20 mg tablet; Take 1 tablet (20 mg total) by mouth daily

## 2025-01-03 NOTE — PROGRESS NOTES
Name: Gela Chamorro      : 1955      MRN: [id number]  Encounter Provider: Yanet Rhodes DO  Encounter Date: 1/3/2025   Encounter department: [facility]  :  Assessment & Plan  Controlled type 2 diabetes mellitus with stage 3 chronic kidney disease, without long-term current use of insulin (HCC)  Currently diet controlled.  Previous value 6.6.  Patient will continue her diet and lifestyle modifications to maintain glucose control.  Will recheck A1C, urine, CMP in 6 months.   Lab Results   Component Value Date    HGBA1C 6.7 (H) 2024       Orders:    Albumin / creatinine urine ratio; Future    Comprehensive metabolic panel; Future    Hemoglobin A1C; Future    atorvastatin (LIPITOR) 20 mg tablet; Take 1 tablet (20 mg total) by mouth daily    Stage 3a chronic kidney disease (HCC)  Lab Results   Component Value Date    EGFR 69 2024    EGFR 59 2024    EGFR 53 2024    CREATININE 0.86 2024    CREATININE 0.99 2024    CREATININE 1.07 2024   GFR fluctuating between 53 and 69 with past values.  Patient hydrating well.  Will recheck for proteinuria in 6 months.  Previous proteinuria values within normal limits. Disucssed with patient the possible need and recommendation for ACE patient to monitor BP at home Patient to have the urine test done will discuss further in 3 motnhs         Elevated BP without diagnosis of hypertension  Blood pressure recheck today in the office and found to be 148/92.  Patient is currently asymptomatic aside from a small headache attributing from lack of sleep due to a situational stressor.  We discussed the benefits of starting a ACE inhibitor given her diabetes and stage IIIa CKD.  She would like to continue monitoring her blood pressures at home manually and record them and let us know of her readings as the elevated blood pressure today in the office is multifactorial/situational in etiology. Followup 3 motnhs         Dyslipidemia  Continue statin 20 mg.  Patient denies any adverse effects.  Patient is adhering to diet lifestyle modifications.  Will recheck level in 6 months. Order placed for refill.   Reviewed guidelines and LDL goal of <70 recommended for aged 40-75 w/ her ASCVD score. Can consider increasing statin to 40 mg next visit if agreeable.  LDL was at 86 down from 137     The 10-year ASCVD risk score (Jeffrey LIGHT, et al., 2019) is: 17.3%    Values used to calculate the score:      Age: 69 years      Sex: Female      Is Non- : No      Diabetic: Yes      Tobacco smoker: No      Systolic Blood Pressure: 148 mmHg      Is BP treated: No      HDL Cholesterol: 54 mg/dL      Total Cholesterol: 162 mg/dL   Lab Results   Component Value Date    LDLCALC 86 2024      Orders:    Lipid Panel with Direct LDL reflex; Future    atorvastatin (LIPITOR) 20 mg tablet; Take 1 tablet (20 mg total) by mouth daily    Acquired hypothyroidism  Patient is euthyroid.  Continue Synthroid 125 mcg daily.  Recheck level in 6 months    Lab Results   Component Value Date    IPZ7TOTPPBXD 2.933 2024    TSH 0.80 2021      Orders:    TSH, 3rd generation with Free T4 reflex; Future    Fibromyalgia  Patient feels that this is well-controlled with her Cymbalta 60 mg for generalized aches and pains.  Continue this medication.       Vitamin D deficiency  Vitamin D level in 2024 was 28.2. 125 mics daily patient is currently supplementing with with vitamin D daily.  Continue this medication and we will recheck level in 6 months.     Orders:    Vitamin D 25 hydroxy; Future          Depression Screening and Follow-up Plan:   Patient's depression screening was negative with an Wins[place]  Depression Scale score of  .     Chief Complaint   Patient presents with    Follow-up     4 month f/u dm       History of Present Illness     Very pleasant female here for 4 month follow-up and lab review.  She does not  "have any overt concerns today regarding her medications and/or side effects. Her diabetes is diet controlled and she monitors her sugars periodically at home.  No episodes of hypoglycemia.  She routinely follows with the ophthalmologist with no diabetic retinopathy revealed on last exam.  Denies any vision changes, chest pain, shortness of breath, edema, palpitations or lightheadedness or dizziness.  She does have a dull minimal headache on exam today attributing to the lack of sleep last night related to situational stressor of a close family member in the hospital rendering her to have a little more coffee this morning (1.5 cups). Thus further contributing to the elevation in BP today in office. Patient tolerating all meds       Review of Systems   Constitutional:  Negative for chills, fatigue and fever.   HENT:  Negative for ear pain and sore throat.    Eyes:  Negative for pain and visual disturbance.   Respiratory:  Negative for cough and shortness of breath.    Cardiovascular:  Negative for chest pain, palpitations and leg swelling.   Gastrointestinal:  Negative for abdominal pain, constipation, diarrhea, nausea and vomiting.   Genitourinary:  Negative for dysuria, frequency, hematuria and urgency.   Musculoskeletal:  Negative for arthralgias and back pain.   Skin:  Negative for color change and rash.   Neurological:  Positive for headaches (current h/a- pt attributes to lack of sleep). Negative for dizziness, seizures, syncope and weakness.   Psychiatric/Behavioral: Negative.     All other systems reviewed and are negative.      Objective   /92 (BP Location: Left arm, Patient Position: Sitting, Cuff Size: Standard)   Pulse (!) 108   Temp 97.5 °F (36.4 °C) (Temporal)   Ht 5' 2\" (1.575 m)   Wt 75.4 kg (166 lb 3.2 oz)   SpO2 98%   BMI 30.40 kg/m²      Physical Exam  Vitals and nursing note reviewed.   Constitutional:       General: She is awake. She is not in acute distress.     Appearance: Normal " appearance. She is well-developed and normal weight. She is not ill-appearing.   HENT:      Head: Normocephalic and atraumatic.      Right Ear: External ear normal.      Left Ear: External ear normal.      Nose: Nose normal. No congestion or rhinorrhea.   Eyes:      Extraocular Movements: Extraocular movements intact.      Conjunctiva/sclera: Conjunctivae normal.      Pupils: Pupils are equal, round, and reactive to light.   Cardiovascular:      Rate and Rhythm: Normal rate and regular rhythm.      Pulses: Normal pulses.           Radial pulses are 2+ on the right side and 2+ on the left side.        Dorsalis pedis pulses are 2+ on the right side and 2+ on the left side.      Heart sounds: S1 normal and S2 normal. No murmur heard.     No friction rub. No gallop.   Pulmonary:      Effort: Pulmonary effort is normal. No respiratory distress.      Breath sounds: Normal breath sounds. No wheezing or rhonchi.   Abdominal:      General: Bowel sounds are normal.      Palpations: Abdomen is soft.      Tenderness: There is no abdominal tenderness.   Musculoskeletal:         General: No swelling.      Cervical back: Normal range of motion and neck supple.      Right lower leg: No edema.      Left lower leg: No edema.   Skin:     General: Skin is warm and dry.      Findings: No lesion or rash.   Neurological:      General: No focal deficit present.      Mental Status: She is alert and oriented to person, place, and time.   Psychiatric:         Mood and Affect: Mood normal.         Behavior: Behavior normal.         Thought Content: Thought content normal.       CRISTOBAL Brown   [facility]   1/3/685438:30 AM

## 2025-01-03 NOTE — ASSESSMENT & PLAN NOTE
Currently diet controlled.  Previous value 6.6.  Patient will continue her diet and lifestyle modifications to maintain glucose control.  Will recheck A1C, urine, CMP in 6 months.   Lab Results   Component Value Date    HGBA1C 6.7 (H) 12/20/2024       Orders:    Albumin / creatinine urine ratio; Future    Comprehensive metabolic panel; Future    Hemoglobin A1C; Future    atorvastatin (LIPITOR) 20 mg tablet; Take 1 tablet (20 mg total) by mouth daily

## 2025-01-03 NOTE — ASSESSMENT & PLAN NOTE
Lab Results   Component Value Date    EGFR 69 12/20/2024    EGFR 59 08/24/2024    EGFR 53 02/17/2024    CREATININE 0.86 12/20/2024    CREATININE 0.99 08/24/2024    CREATININE 1.07 02/17/2024   GFR fluctuating between 53 and 69 with past values.  Patient hydrating well.  Will recheck for proteinuria in 6 months.  Previous proteinuria values within normal limits. Disucssed with patient the possible need and recommendation for ACE patient to monitor BP at home Patient to have the urine test done will discuss further in 3 motnhs

## 2025-01-03 NOTE — ASSESSMENT & PLAN NOTE
Patient is euthyroid.  Continue Synthroid 125 mcg daily.  Recheck level in 6 months    Lab Results   Component Value Date    [id number] 2.933 08/24/2024    TSH 0.80 08/30/2021      Orders:    TSH, 3rd generation with Free T4 reflex; Future

## 2025-03-17 DIAGNOSIS — N18.30 CONTROLLED TYPE 2 DIABETES MELLITUS WITH STAGE 3 CHRONIC KIDNEY DISEASE, WITHOUT LONG-TERM CURRENT USE OF INSULIN (HCC): ICD-10-CM

## 2025-03-17 DIAGNOSIS — E78.5 DYSLIPIDEMIA: ICD-10-CM

## 2025-03-17 DIAGNOSIS — E11.22 CONTROLLED TYPE 2 DIABETES MELLITUS WITH STAGE 3 CHRONIC KIDNEY DISEASE, WITHOUT LONG-TERM CURRENT USE OF INSULIN (HCC): ICD-10-CM

## 2025-03-18 RX ORDER — ATORVASTATIN CALCIUM 20 MG/1
20 TABLET, FILM COATED ORAL DAILY
Qty: 90 TABLET | Refills: 1 | Status: SHIPPED | OUTPATIENT
Start: 2025-03-18

## 2025-03-23 DIAGNOSIS — E03.9 ACQUIRED HYPOTHYROIDISM: ICD-10-CM

## 2025-03-23 DIAGNOSIS — Z79.899 MEDICATION DOSE CHANGED: ICD-10-CM

## 2025-03-24 RX ORDER — LEVOTHYROXINE SODIUM 125 UG/1
125 TABLET ORAL DAILY
Qty: 100 TABLET | Refills: 1 | Status: SHIPPED | OUTPATIENT
Start: 2025-03-24

## 2025-04-05 ENCOUNTER — APPOINTMENT (OUTPATIENT)
Dept: LAB | Facility: HOSPITAL | Age: 70
End: 2025-04-05
Payer: COMMERCIAL

## 2025-04-05 DIAGNOSIS — E55.9 VITAMIN D DEFICIENCY: ICD-10-CM

## 2025-04-05 DIAGNOSIS — E78.5 DYSLIPIDEMIA: ICD-10-CM

## 2025-04-05 DIAGNOSIS — N18.30 CONTROLLED TYPE 2 DIABETES MELLITUS WITH STAGE 3 CHRONIC KIDNEY DISEASE, WITHOUT LONG-TERM CURRENT USE OF INSULIN (HCC): ICD-10-CM

## 2025-04-05 DIAGNOSIS — E03.9 ACQUIRED HYPOTHYROIDISM: ICD-10-CM

## 2025-04-05 DIAGNOSIS — E11.22 CONTROLLED TYPE 2 DIABETES MELLITUS WITH STAGE 3 CHRONIC KIDNEY DISEASE, WITHOUT LONG-TERM CURRENT USE OF INSULIN (HCC): ICD-10-CM

## 2025-04-05 LAB
25(OH)D3 SERPL-MCNC: 38.3 NG/ML (ref 30–100)
ALBUMIN SERPL BCG-MCNC: 4.3 G/DL (ref 3.5–5)
ALP SERPL-CCNC: 93 U/L (ref 34–104)
ALT SERPL W P-5'-P-CCNC: 47 U/L (ref 7–52)
ANION GAP SERPL CALCULATED.3IONS-SCNC: 6 MMOL/L (ref 4–13)
AST SERPL W P-5'-P-CCNC: 37 U/L (ref 13–39)
BILIRUB SERPL-MCNC: 0.77 MG/DL (ref 0.2–1)
BUN SERPL-MCNC: 14 MG/DL (ref 5–25)
CALCIUM SERPL-MCNC: 9.7 MG/DL (ref 8.4–10.2)
CHLORIDE SERPL-SCNC: 102 MMOL/L (ref 96–108)
CHOLEST SERPL-MCNC: 170 MG/DL (ref ?–200)
CO2 SERPL-SCNC: 30 MMOL/L (ref 21–32)
CREAT SERPL-MCNC: 0.87 MG/DL (ref 0.6–1.3)
CREAT UR-MCNC: 159.7 MG/DL
EST. AVERAGE GLUCOSE BLD GHB EST-MCNC: 160 MG/DL
GFR SERPL CREATININE-BSD FRML MDRD: 68 ML/MIN/1.73SQ M
GLUCOSE P FAST SERPL-MCNC: 108 MG/DL (ref 65–99)
HBA1C MFR BLD: 7.2 %
HDLC SERPL-MCNC: 58 MG/DL
LDLC SERPL CALC-MCNC: 90 MG/DL (ref 0–100)
MICROALBUMIN UR-MCNC: 17 MG/L
MICROALBUMIN/CREAT 24H UR: 11 MG/G CREATININE (ref 0–30)
POTASSIUM SERPL-SCNC: 4.1 MMOL/L (ref 3.5–5.3)
PROT SERPL-MCNC: 7.5 G/DL (ref 6.4–8.4)
SODIUM SERPL-SCNC: 138 MMOL/L (ref 135–147)
T4 FREE SERPL-MCNC: 0.88 NG/DL (ref 0.61–1.12)
TRIGL SERPL-MCNC: 109 MG/DL (ref ?–150)
TSH SERPL DL<=0.05 MIU/L-ACNC: 0.03 UIU/ML (ref 0.45–4.5)

## 2025-04-05 PROCEDURE — 80053 COMPREHEN METABOLIC PANEL: CPT

## 2025-04-05 PROCEDURE — 83036 HEMOGLOBIN GLYCOSYLATED A1C: CPT

## 2025-04-05 PROCEDURE — 82570 ASSAY OF URINE CREATININE: CPT

## 2025-04-05 PROCEDURE — 84439 ASSAY OF FREE THYROXINE: CPT

## 2025-04-05 PROCEDURE — 84443 ASSAY THYROID STIM HORMONE: CPT

## 2025-04-05 PROCEDURE — 80061 LIPID PANEL: CPT

## 2025-04-05 PROCEDURE — 82043 UR ALBUMIN QUANTITATIVE: CPT

## 2025-04-05 PROCEDURE — 82306 VITAMIN D 25 HYDROXY: CPT

## 2025-04-05 PROCEDURE — 36415 COLL VENOUS BLD VENIPUNCTURE: CPT

## 2025-04-07 ENCOUNTER — RESULTS FOLLOW-UP (OUTPATIENT)
Dept: FAMILY MEDICINE CLINIC | Facility: CLINIC | Age: 70
End: 2025-04-07

## 2025-04-07 DIAGNOSIS — E03.9 ACQUIRED HYPOTHYROIDISM: Primary | ICD-10-CM

## 2025-04-07 RX ORDER — LEVOTHYROXINE SODIUM 112 UG/1
112 TABLET ORAL
Qty: 100 TABLET | Refills: 0 | Status: SHIPPED | OUTPATIENT
Start: 2025-04-07

## 2025-04-07 NOTE — RESULT ENCOUNTER NOTE
Lm to let pt know that TSH was low, levothyroxine was decreased and pt should repeat blood work in 3 months

## 2025-04-08 NOTE — TELEPHONE ENCOUNTER
Pt called back for results.  See message below.  Pt agreed for me to read her results.    Pt understands and stated she has an appt on 4/11 with Dr. Rhodes.

## 2025-04-11 NOTE — TELEPHONE ENCOUNTER
Patient called and would like the PCP to look at the A1C that was just taken.  She has concerns.  Pls advise.  [phone].      Thank you

## 2025-04-14 NOTE — TELEPHONE ENCOUNTER
----- Message from Yanet Rhodes DO sent at 4/14/2025  8:08 AM EDT -----  Call patient her diabetes is a bit worse based on her labs Her A1c should be < 7 Patient needs to watch diet closely She should cut out all concentrated sweets  and eat a low carb diet She has an appt in August We will check her A1c at that time if it is not < 7 we will start medication for diabetes  ----- Message -----  From: Elma Mendiola  Sent: 4/11/2025   8:42 AM EDT  To: Yanet Rhodes DO      ----- Message -----  From: hSeryl Pierce  Sent: 4/11/2025   8:20 AM EDT  To: [facility]

## 2025-05-22 ENCOUNTER — RA CDI HCC (OUTPATIENT)
Dept: OTHER | Facility: HOSPITAL | Age: 70
End: 2025-05-22

## 2025-05-22 NOTE — PROGRESS NOTES
HCC coding opportunities       Chart reviewed, no opportunity found: CHART REVIEWED, NO OPPORTUNITY FOUND   E11.22  N18.30   Both on PL and BPA; assessed and documented 1/3/25    [facility] AUDIT      Patients Insurance     Medicare Insurance: [employer]age

## 2025-06-24 ENCOUNTER — TELEPHONE (OUTPATIENT)
Age: 70
End: 2025-06-24

## 2025-06-24 NOTE — TELEPHONE ENCOUNTER
Patient contacted the office this morning in regards to upcoming appt on 08/08/25 with pcp. Asking in regards to repeating lab work. Active order for TSH, wanting to know if she should be getting blood sugar checked as well. Has noticed more dizziness at times, is unsure if it could be related to blood sugar or thyroid as this was changed recently. Asking if pcp would order labs to check for appt in August. Please advise.

## 2025-06-26 DIAGNOSIS — F41.1 GENERALIZED ANXIETY DISORDER: ICD-10-CM

## 2025-06-27 RX ORDER — DULOXETIN HYDROCHLORIDE 60 MG/1
60 CAPSULE, DELAYED RELEASE ORAL DAILY
Qty: 90 CAPSULE | Refills: 0 | Status: SHIPPED | OUTPATIENT
Start: 2025-06-27

## 2025-07-14 ENCOUNTER — APPOINTMENT (OUTPATIENT)
Dept: LAB | Facility: HOSPITAL | Age: 70
End: 2025-07-14
Payer: COMMERCIAL

## 2025-07-14 DIAGNOSIS — E03.9 ACQUIRED HYPOTHYROIDISM: ICD-10-CM

## 2025-07-14 LAB
T4 FREE SERPL-MCNC: 0.89 NG/DL (ref 0.61–1.12)
TSH SERPL DL<=0.05 MIU/L-ACNC: 0.09 UIU/ML (ref 0.45–4.5)

## 2025-07-14 PROCEDURE — 36415 COLL VENOUS BLD VENIPUNCTURE: CPT

## 2025-07-14 PROCEDURE — 84443 ASSAY THYROID STIM HORMONE: CPT

## 2025-07-14 PROCEDURE — 84439 ASSAY OF FREE THYROXINE: CPT

## 2025-07-16 ENCOUNTER — OFFICE VISIT (OUTPATIENT)
Dept: FAMILY MEDICINE CLINIC | Facility: CLINIC | Age: 70
End: 2025-07-16
Payer: COMMERCIAL

## 2025-07-16 VITALS
HEIGHT: 63 IN | TEMPERATURE: 97.9 F | BODY MASS INDEX: 30.05 KG/M2 | SYSTOLIC BLOOD PRESSURE: 124 MMHG | HEART RATE: 98 BPM | OXYGEN SATURATION: 99 % | WEIGHT: 169.6 LBS | DIASTOLIC BLOOD PRESSURE: 80 MMHG

## 2025-07-16 DIAGNOSIS — E03.9 ACQUIRED HYPOTHYROIDISM: ICD-10-CM

## 2025-07-16 DIAGNOSIS — M54.2 NECK PAIN ON RIGHT SIDE: Primary | ICD-10-CM

## 2025-07-16 PROCEDURE — 99213 OFFICE O/P EST LOW 20 MIN: CPT

## 2025-07-16 PROCEDURE — G2211 COMPLEX E/M VISIT ADD ON: HCPCS

## 2025-07-16 RX ORDER — METHOCARBAMOL 500 MG/1
500 TABLET, FILM COATED ORAL 3 TIMES DAILY
Qty: 42 TABLET | Refills: 0 | Status: SHIPPED | OUTPATIENT
Start: 2025-07-16

## 2025-07-16 RX ORDER — LEVOTHYROXINE SODIUM 112 UG/1
112 TABLET ORAL
Qty: 30 TABLET | Refills: 0 | Status: SHIPPED | OUTPATIENT
Start: 2025-07-16

## 2025-07-16 NOTE — ASSESSMENT & PLAN NOTE
Orders:  •  levothyroxine 112 mcg tablet; Take 1 tablet (112 mcg total) by mouth daily in the early morning

## 2025-07-16 NOTE — PROGRESS NOTES
Name: Gela Chamorro      : 1955      MRN: [id number]  Encounter Provider: Adriana Pulliam PA-C  Encounter Date: 2025   Encounter department: [facility] PRIMARY CARE  :  Assessment & Plan  Neck pain on right side  New diagnosis acute symptomatic symptoms for 10 days  Suspect more muscular in source, no red flags no injury or trauma  -Will hold off on x-ray at this point  -Use heating pad to relax muscles; ice for acute pain  -Use ibuprofen (Advil) 400 mg every 4 hours as needed for pain OR naproxen (Aleve) 500 mg every 8 hours  -Adequately stretch with dynamic stretching at beginning of strength straining, then static stretching after  -Go to ER if develop chest pain, weakness of arms/legs, loss of bladder/bowel control, or drooping of side of face and slurred speech  - Can use Robaxin to help  Educated patient on medication usage, side effects, administration, risks and benefits. Patient stated understanding.      Orders:  •  methocarbamol (ROBAXIN) 500 mg tablet; Take 1 tablet (500 mg total) by mouth 3 (three) times a day    Acquired hypothyroidism    Orders:  •  levothyroxine 112 mcg tablet; Take 1 tablet (112 mcg total) by mouth daily in the early morning           History of Present Illness {?Quick Links Encounters * My Last Note * Last Note in Specialty * Snapshot * Since Last Visit * History :66924}  Sharp pain x 1 1/2 weeks right neck behind ear then into temporal  Pain is preventing her up from sleep  Comes and goes  Motrin  No injury, no travel  Denies fever, cough, blurry vision, weakness, numbness, tingling  Denies trigger that make it worse  Becoming more frequent   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    Cardiovascular:  Negative for chest pain.   Musculoskeletal:  Positive for neck pain. Negative for neck stiffness.  "  Neurological:  Positive for headaches. Negative for dizziness, syncope, weakness, light-headedness and numbness.       Objective {?Quick Links Trend Vitals * Enter New Vitals * Results Review * Timeline (Adult) * Labs * Imaging * Cardiology * Procedures * Lung Cancer Screening * Surgical eConsent :02704}  /80 (BP Location: Left arm, Patient Position: Sitting, Cuff Size: Standard)   Pulse 98   Temp 97.9 °F (36.6 °C) (Tympanic)   Ht 5' 2.99\" (1.6 m)   Wt 76.9 kg (169 lb 9.6 oz)   SpO2 99%   BMI 30.05 kg/m²      Physical Exam  Vitals and nursing note reviewed.   Constitutional:       General: She is not in acute distress.     Appearance: She is well-developed.   HENT:      Head: Normocephalic and atraumatic.     Eyes:      General: No visual field deficit.     Conjunctiva/sclera: Conjunctivae normal.     Neck:        Comments: Pain with left rotation of head.  No pain with flexion extension lateral flexion of neck.  Palpated a muscle knot where indicated  Cardiovascular:      Rate and Rhythm: Normal rate and regular rhythm.      Pulses: Normal pulses.      Heart sounds: No murmur heard.  Pulmonary:      Effort: Pulmonary effort is normal. No respiratory distress.      Breath sounds: Normal breath sounds.   Abdominal:      Palpations: Abdomen is soft.      Tenderness: There is no abdominal tenderness.     Musculoskeletal:         General: No swelling.      Cervical back: Neck supple. No rigidity or torticollis. Pain with movement and muscular tenderness present. No spinous process tenderness. Normal range of motion.     Skin:     General: Skin is warm and dry.      Capillary Refill: Capillary refill takes less than 2 seconds.     Neurological:      General: No focal deficit present.      Mental Status: She is alert and oriented to person, place, and time.      Cranial Nerves: Cranial nerves 2-12 are intact. No cranial nerve deficit or facial asymmetry.      Sensory: Sensation is intact.      Motor: Motor " function is intact. No weakness, tremor, atrophy or abnormal muscle tone.      Coordination: Coordination is intact. Romberg sign negative. Finger-Nose-Finger Test and Heel to Shin Test normal.      Gait: Gait is intact. Gait and tandem walk normal.     Psychiatric:         Mood and Affect: Mood normal.       Adriana Pulliam PA-C

## 2025-07-22 DIAGNOSIS — E03.9 ACQUIRED HYPOTHYROIDISM: ICD-10-CM

## 2025-07-22 NOTE — TELEPHONE ENCOUNTER
Requested medication(s) are due for refill today: Yes  **If antibiotic or given during sick visit, contact patient to discuss current symptoms.   **Confirm prescribing provider    LOV:  7/16/25  **If longer then 1 year, contact patient to schedule annual PRIOR to refilling. Once scheduled, adjust refill for 30 days, no refills.  **Update CareEverywhere to confirm not being seen elsewhere    NOV:  8/8/25    Is patient due for annual visit: Yes, describe: scheduled  **If future appointment, adjust to annual/follow up.  ** No appointment call to schedule annual/follow up.    Route to PCP, unless PCP no longer here, then physician they are seeing next.

## 2025-07-23 RX ORDER — LEVOTHYROXINE SODIUM 112 UG/1
112 TABLET ORAL
Qty: 30 TABLET | Refills: 0 | OUTPATIENT
Start: 2025-07-23

## 2025-07-29 ENCOUNTER — TELEPHONE (OUTPATIENT)
Age: 70
End: 2025-07-29

## 2025-07-29 DIAGNOSIS — E11.22 CONTROLLED TYPE 2 DIABETES MELLITUS WITH STAGE 3 CHRONIC KIDNEY DISEASE, WITHOUT LONG-TERM CURRENT USE OF INSULIN (HCC): ICD-10-CM

## 2025-07-29 DIAGNOSIS — N18.30 CONTROLLED TYPE 2 DIABETES MELLITUS WITH STAGE 3 CHRONIC KIDNEY DISEASE, WITHOUT LONG-TERM CURRENT USE OF INSULIN (HCC): Primary | ICD-10-CM

## 2025-07-29 DIAGNOSIS — E11.22 CONTROLLED TYPE 2 DIABETES MELLITUS WITH STAGE 3 CHRONIC KIDNEY DISEASE, WITHOUT LONG-TERM CURRENT USE OF INSULIN (HCC): Primary | ICD-10-CM

## 2025-07-29 DIAGNOSIS — N18.30 CONTROLLED TYPE 2 DIABETES MELLITUS WITH STAGE 3 CHRONIC KIDNEY DISEASE, WITHOUT LONG-TERM CURRENT USE OF INSULIN (HCC): ICD-10-CM

## 2025-08-05 ENCOUNTER — RESULTS FOLLOW-UP (OUTPATIENT)
Dept: FAMILY MEDICINE CLINIC | Facility: CLINIC | Age: 70
End: 2025-08-05

## 2025-08-05 ENCOUNTER — APPOINTMENT (OUTPATIENT)
Dept: LAB | Facility: HOSPITAL | Age: 70
End: 2025-08-05
Payer: COMMERCIAL

## 2025-08-05 DIAGNOSIS — E11.22 CONTROLLED TYPE 2 DIABETES MELLITUS WITH STAGE 3 CHRONIC KIDNEY DISEASE, WITHOUT LONG-TERM CURRENT USE OF INSULIN (HCC): ICD-10-CM

## 2025-08-05 DIAGNOSIS — N18.30 CONTROLLED TYPE 2 DIABETES MELLITUS WITH STAGE 3 CHRONIC KIDNEY DISEASE, WITHOUT LONG-TERM CURRENT USE OF INSULIN (HCC): ICD-10-CM

## 2025-08-05 LAB
EST. AVERAGE GLUCOSE BLD GHB EST-MCNC: 157 MG/DL
HBA1C MFR BLD: 7.1 %

## 2025-08-05 PROCEDURE — 83036 HEMOGLOBIN GLYCOSYLATED A1C: CPT

## 2025-08-05 PROCEDURE — 36415 COLL VENOUS BLD VENIPUNCTURE: CPT

## 2025-08-08 ENCOUNTER — OFFICE VISIT (OUTPATIENT)
Dept: FAMILY MEDICINE CLINIC | Facility: CLINIC | Age: 70
End: 2025-08-08
Payer: COMMERCIAL

## 2025-08-08 VITALS
OXYGEN SATURATION: 96 % | TEMPERATURE: 97 F | HEART RATE: 89 BPM | HEIGHT: 63 IN | DIASTOLIC BLOOD PRESSURE: 80 MMHG | BODY MASS INDEX: 29.59 KG/M2 | SYSTOLIC BLOOD PRESSURE: 142 MMHG | RESPIRATION RATE: 18 BRPM | WEIGHT: 167 LBS

## 2025-08-08 DIAGNOSIS — E78.5 DYSLIPIDEMIA: ICD-10-CM

## 2025-08-08 DIAGNOSIS — N18.31 STAGE 3A CHRONIC KIDNEY DISEASE (HCC): ICD-10-CM

## 2025-08-08 DIAGNOSIS — M79.7 FIBROMYALGIA: ICD-10-CM

## 2025-08-08 DIAGNOSIS — N18.30 CONTROLLED TYPE 2 DIABETES MELLITUS WITH STAGE 3 CHRONIC KIDNEY DISEASE, WITHOUT LONG-TERM CURRENT USE OF INSULIN (HCC): ICD-10-CM

## 2025-08-08 DIAGNOSIS — R06.83 SNORING: ICD-10-CM

## 2025-08-08 DIAGNOSIS — Z00.00 MEDICARE ANNUAL WELLNESS VISIT, SUBSEQUENT: Primary | ICD-10-CM

## 2025-08-08 DIAGNOSIS — G47.9 SLEEP DISORDER: ICD-10-CM

## 2025-08-08 DIAGNOSIS — E66.3 OVERWEIGHT WITH BODY MASS INDEX (BMI) OF 29 TO 29.9 IN ADULT: ICD-10-CM

## 2025-08-08 DIAGNOSIS — E03.9 ACQUIRED HYPOTHYROIDISM: ICD-10-CM

## 2025-08-08 DIAGNOSIS — E11.22 CONTROLLED TYPE 2 DIABETES MELLITUS WITH STAGE 3 CHRONIC KIDNEY DISEASE, WITHOUT LONG-TERM CURRENT USE OF INSULIN (HCC): ICD-10-CM

## 2025-08-08 DIAGNOSIS — G47.8 UNREFRESHED BY SLEEP: ICD-10-CM

## 2025-08-08 PROBLEM — R03.0 ELEVATED BP WITHOUT DIAGNOSIS OF HYPERTENSION: Status: RESOLVED | Noted: 2025-01-03 | Resolved: 2025-08-08

## 2025-08-08 PROBLEM — E55.9 VITAMIN D DEFICIENCY: Status: RESOLVED | Noted: 2020-10-20 | Resolved: 2025-08-08

## 2025-08-08 PROCEDURE — G2211 COMPLEX E/M VISIT ADD ON: HCPCS | Performed by: FAMILY MEDICINE

## 2025-08-08 PROCEDURE — G0439 PPPS, SUBSEQ VISIT: HCPCS | Performed by: FAMILY MEDICINE

## 2025-08-08 PROCEDURE — 99214 OFFICE O/P EST MOD 30 MIN: CPT | Performed by: FAMILY MEDICINE

## 2025-08-13 ENCOUNTER — TELEPHONE (OUTPATIENT)
Age: 70
End: 2025-08-13

## 2025-08-14 ENCOUNTER — HOSPITAL ENCOUNTER (OUTPATIENT)
Dept: RADIOLOGY | Facility: HOSPITAL | Age: 70
Discharge: HOME | End: 2025-08-14
Payer: COMMERCIAL

## 2025-08-14 ENCOUNTER — RESULTS FOLLOW-UP (OUTPATIENT)
Dept: FAMILY MEDICINE CLINIC | Facility: CLINIC | Age: 70
End: 2025-08-14

## 2025-08-14 PROBLEM — M50.30 DEGENERATIVE CERVICAL DISC: Status: ACTIVE | Noted: 2025-08-14

## 2025-08-18 ENCOUNTER — TRANSCRIBE ORDERS (OUTPATIENT)
Dept: SLEEP CENTER | Facility: CLINIC | Age: 70
End: 2025-08-18

## 2025-08-18 DIAGNOSIS — R06.83 SNORING: Primary | ICD-10-CM

## 2025-08-18 DIAGNOSIS — G47.9 SLEEP DISORDER: ICD-10-CM

## 2025-08-18 DIAGNOSIS — G47.8 UNREFRESHED BY SLEEP: ICD-10-CM
